# Patient Record
Sex: FEMALE | Race: WHITE | NOT HISPANIC OR LATINO | Employment: FULL TIME | URBAN - METROPOLITAN AREA
[De-identification: names, ages, dates, MRNs, and addresses within clinical notes are randomized per-mention and may not be internally consistent; named-entity substitution may affect disease eponyms.]

---

## 2017-01-11 LAB
EXTERNAL ABO GROUPING: NORMAL
EXTERNAL ANTIBODY SCREEN: NEGATIVE
EXTERNAL HEPATITIS B SURFACE ANTIGEN: NEGATIVE
EXTERNAL RH FACTOR: NEGATIVE
EXTERNAL RUBELLA QUALITATIVE: NORMAL
EXTERNAL SYPHILIS RPR SCREEN: NORMAL
HIV1 AB SPEC QL IA.RAPID: NEGATIVE

## 2017-06-06 LAB — EXTERNAL GTT 1 HOUR: 102

## 2017-07-31 PROCEDURE — 87081 CULTURE SCREEN ONLY: CPT | Performed by: OBSTETRICS & GYNECOLOGY

## 2017-08-01 ENCOUNTER — LAB REQUISITION (OUTPATIENT)
Dept: LAB | Facility: HOSPITAL | Age: 37
End: 2017-08-01

## 2017-08-01 DIAGNOSIS — Z34.83 ENCOUNTER FOR SUPERVISION OF OTHER NORMAL PREGNANCY, THIRD TRIMESTER: ICD-10-CM

## 2017-08-04 LAB
BACTERIA SPEC AEROBE CULT: NORMAL
EXTERNAL GROUP B STREP ANTIGEN: NEGATIVE

## 2017-08-18 ENCOUNTER — PREP FOR SURGERY (OUTPATIENT)
Dept: OTHER | Facility: HOSPITAL | Age: 37
End: 2017-08-18

## 2017-08-18 DIAGNOSIS — Z3A.39 39 WEEKS GESTATION OF PREGNANCY: Primary | ICD-10-CM

## 2017-08-18 RX ORDER — SODIUM CHLORIDE 0.9 % (FLUSH) 0.9 %
1-10 SYRINGE (ML) INJECTION AS NEEDED
Status: CANCELLED | OUTPATIENT
Start: 2017-08-18

## 2017-08-18 RX ORDER — SODIUM CHLORIDE, SODIUM LACTATE, POTASSIUM CHLORIDE, CALCIUM CHLORIDE 600; 310; 30; 20 MG/100ML; MG/100ML; MG/100ML; MG/100ML
100 INJECTION, SOLUTION INTRAVENOUS CONTINUOUS
Status: CANCELLED | OUTPATIENT
Start: 2017-08-18

## 2017-08-18 RX ORDER — CEFAZOLIN SODIUM 2 G/100ML
2 INJECTION, SOLUTION INTRAVENOUS ONCE
Status: CANCELLED | OUTPATIENT
Start: 2017-08-18 | End: 2017-08-18

## 2017-08-20 ENCOUNTER — APPOINTMENT (OUTPATIENT)
Dept: PREADMISSION TESTING | Facility: HOSPITAL | Age: 37
End: 2017-08-20

## 2017-08-20 VITALS — HEIGHT: 67 IN | WEIGHT: 212.3 LBS | BODY MASS INDEX: 33.32 KG/M2

## 2017-08-20 DIAGNOSIS — Z3A.39 39 WEEKS GESTATION OF PREGNANCY: ICD-10-CM

## 2017-08-20 LAB
ABO GROUP BLD: NORMAL
ANTI-D, PASSIVE: NORMAL
BASOPHILS # BLD AUTO: 0.01 10*3/MM3 (ref 0–0.2)
BASOPHILS NFR BLD AUTO: 0.1 % (ref 0–1)
BLD GP AB SCN SERPL QL: POSITIVE
DEPRECATED RDW RBC AUTO: 45.1 FL (ref 37–54)
EOSINOPHIL # BLD AUTO: 0.11 10*3/MM3 (ref 0–0.3)
EOSINOPHIL NFR BLD AUTO: 1.3 % (ref 0–3)
ERYTHROCYTE [DISTWIDTH] IN BLOOD BY AUTOMATED COUNT: 14.6 % (ref 11.3–14.5)
HCT VFR BLD AUTO: 33.6 % (ref 34.5–44)
HGB BLD-MCNC: 10.6 G/DL (ref 11.5–15.5)
IMM GRANULOCYTES # BLD: 0.03 10*3/MM3 (ref 0–0.03)
IMM GRANULOCYTES NFR BLD: 0.4 % (ref 0–0.6)
LYMPHOCYTES # BLD AUTO: 1.61 10*3/MM3 (ref 0.6–4.8)
LYMPHOCYTES NFR BLD AUTO: 19.1 % (ref 24–44)
MCH RBC QN AUTO: 27.3 PG (ref 27–31)
MCHC RBC AUTO-ENTMCNC: 31.5 G/DL (ref 32–36)
MCV RBC AUTO: 86.6 FL (ref 80–99)
MONOCYTES # BLD AUTO: 0.54 10*3/MM3 (ref 0–1)
MONOCYTES NFR BLD AUTO: 6.4 % (ref 0–12)
NEUTROPHILS # BLD AUTO: 6.13 10*3/MM3 (ref 1.5–8.3)
NEUTROPHILS NFR BLD AUTO: 72.7 % (ref 41–71)
PLATELET # BLD AUTO: 164 10*3/MM3 (ref 150–450)
PMV BLD AUTO: 11.3 FL (ref 6–12)
RBC # BLD AUTO: 3.88 10*6/MM3 (ref 3.89–5.14)
RH BLD: NEGATIVE
WBC NRBC COR # BLD: 8.43 10*3/MM3 (ref 3.5–10.8)

## 2017-08-20 NOTE — PAT
MEASURED FOR TEDS/SCDS IN PAT    CALF MEASUREMENT    17  LENGTH MEASUREMENT 18.5    Patient to apply to surgical area (as instructed) the night before procedure and the AM of procedure.

## 2017-08-20 NOTE — DISCHARGE INSTRUCTIONS
Dear Patient,    We are happy that you have chosen [facility] to have your baby.  We hope that by providing you with the following information, your childbirth experience will be a positive one.    What to know before your arrive:     -Do not eat, drink or chew gum after midnight the day before your procedure.    This also includes mints.   -Do not shave any part of your body including abdomen or pelvic are for two    days before your procedure.   -If you are taking a scheduled medication (insulin, blood pressure medicine,   antibiotics) please consult with your physician whether to take on the day of   surgery.   -Remove all jewelry including rings, wedding bands, and piercing before coming   to the hospital.   -Leave important valuables at home.   -Do not wear dark fingernail polish.   -Bring the following with you to the hospital:    -Picture ID and insurance, Medicare or Medicaid cards    -Co-pay/deductible required by insurance (Cash, Check, Credit Card)    -Copy of living will or power  document (if applicable)    -CPAP mask and tubing, not machine (if applicable)    -Skin prep instructions sheet    What to know the day of procedure:     -[facility], take elevator for first floor, exit to the right and  proceed through the doors to outside, follow the covered sidewalk to the  entrance of the [facility], follow the hallway and signs to the [facility],  enter the [place] to your right BEFORE entering the 1720 lobby.  Take the  elevators to the 3rd floor ([address] [place]).   -Leave unnecessary items in your vehicle, including your suitcase.  Your support  person or a family member can get it for you after your procedure.   -Check in at the reception desk in the lobby of the 3rd floor ([address] [place]).   -One person may accompany you to the pre-op/recovery area.  Please have  other family members wait in the waiting room.   -An anesthesiologist will meet with your prior to  your procedure.   -After anesthesia has been initiated, one person may accompany you in the  operating room.   -No video cameras are permitted in the operating room; only still cameras,  Please.      What to expect while you are in recovery:     -One person may stay with you while you are in recovery.   -If the baby is stable, he/she may visit to initiate breastfeeding & [facility].

## 2017-08-21 ENCOUNTER — ANESTHESIA (OUTPATIENT)
Dept: LABOR AND DELIVERY | Facility: HOSPITAL | Age: 37
End: 2017-08-21

## 2017-08-21 ENCOUNTER — ANESTHESIA EVENT (OUTPATIENT)
Dept: LABOR AND DELIVERY | Facility: HOSPITAL | Age: 37
End: 2017-08-21

## 2017-08-21 ENCOUNTER — HOSPITAL ENCOUNTER (INPATIENT)
Facility: HOSPITAL | Age: 37
LOS: 3 days | Discharge: HOME | End: 2017-08-24
Attending: OBSTETRICS & GYNECOLOGY | Admitting: OBSTETRICS & GYNECOLOGY

## 2017-08-21 VITALS — SYSTOLIC BLOOD PRESSURE: 108 MMHG | HEART RATE: 92 BPM | DIASTOLIC BLOOD PRESSURE: 57 MMHG | OXYGEN SATURATION: 100 %

## 2017-08-21 DIAGNOSIS — Z3A.39 39 WEEKS GESTATION OF PREGNANCY: ICD-10-CM

## 2017-08-21 PROBLEM — Z34.90 CURRENTLY PREGNANT: Status: ACTIVE | Noted: 2017-08-21

## 2017-08-21 LAB
AMPHET+METHAMPHET UR QL: NEGATIVE
AMPHETAMINES UR QL: NEGATIVE
ATMOSPHERIC PRESS: ABNORMAL MMHG
ATMOSPHERIC PRESS: ABNORMAL MMHG
BARBITURATES UR QL SCN: NEGATIVE
BASE EXCESS BLDCOA CALC-SCNC: -5.4 MMOL/L (ref 0–2)
BASE EXCESS BLDCOV CALC-SCNC: -3.8 MMOL/L (ref 0–2)
BDY SITE: ABNORMAL
BENZODIAZ UR QL SCN: NEGATIVE
BUPRENORPHINE SERPL-MCNC: NEGATIVE NG/ML
CANNABINOIDS SERPL QL: NEGATIVE
CO2 BLDA-SCNC: 24.3 MMOL/L (ref 22–33)
CO2 BLDA-SCNC: 25.2 MMOL/L (ref 22–33)
COCAINE UR QL: NEGATIVE
EXTERNAL URINE DRUG SCREEN: NORMAL
HCO3 BLDCOA-SCNC: 23.4 MMOL/L (ref 16.9–20.5)
HCO3 BLDCOV-SCNC: 22.9 MMOL/L (ref 18.6–21.4)
HGB BLDA-MCNC: 16.5 G/DL (ref 14–18)
HGB BLDA-MCNC: 17.1 G/DL (ref 14–18)
HOROWITZ INDEX BLD+IHG-RTO: 21 %
HOROWITZ INDEX BLD+IHG-RTO: 21 %
METHADONE UR QL SCN: NEGATIVE
MODALITY: ABNORMAL
MODALITY: ABNORMAL
OPIATES UR QL: NEGATIVE
OXYCODONE UR QL SCN: NEGATIVE
PCO2 BLDCOA: 57.2 MMHG (ref 43.3–54.9)
PCO2 BLDCOV: 46.2 MM HG (ref 30–60)
PCP UR QL SCN: NEGATIVE
PH BLDCOA: 7.22 [PH] (ref 7.2–7.3)
PH BLDCOV: 7.3 [PH] (ref 7.19–7.46)
PO2 BLDCOA: 12.2 MMHG (ref 11.5–43.3)
PO2 BLDCOV: 21.2 MM HG
PROPOXYPH UR QL: NEGATIVE
SAO2 % BLDCOA: 14.8 %
SAO2 % BLDCOA: ABNORMAL % (ref 45–75)
SAO2 % BLDCOV: 38.4 %
TRICYCLICS UR QL SCN: NEGATIVE

## 2017-08-21 PROCEDURE — 25010000002 RHO D IMMUNE GLOBULIN 1500 UNIT/2ML SOLUTION PREFILLED SYRINGE: Performed by: OBSTETRICS & GYNECOLOGY

## 2017-08-21 PROCEDURE — 25010000002 FENTANYL CITRATE (PF) 100 MCG/2ML SOLUTION: Performed by: NURSE ANESTHETIST, CERTIFIED REGISTERED

## 2017-08-21 PROCEDURE — 25010000003 MORPHINE PER 10 MG: Performed by: NURSE ANESTHETIST, CERTIFIED REGISTERED

## 2017-08-21 PROCEDURE — 80306 DRUG TEST PRSMV INSTRMNT: CPT | Performed by: OBSTETRICS & GYNECOLOGY

## 2017-08-21 PROCEDURE — 25010000002 ONDANSETRON PER 1 MG: Performed by: NURSE ANESTHETIST, CERTIFIED REGISTERED

## 2017-08-21 PROCEDURE — 82805 BLOOD GASES W/O2 SATURATION: CPT | Performed by: OBSTETRICS & GYNECOLOGY

## 2017-08-21 PROCEDURE — 25010000002 MIDAZOLAM PER 1 MG: Performed by: NURSE ANESTHETIST, CERTIFIED REGISTERED

## 2017-08-21 PROCEDURE — 25010000002 METOCLOPRAMIDE PER 10 MG: Performed by: NURSE ANESTHETIST, CERTIFIED REGISTERED

## 2017-08-21 PROCEDURE — 59025 FETAL NON-STRESS TEST: CPT

## 2017-08-21 PROCEDURE — 25010000003 CEFAZOLIN IN DEXTROSE 2-4 GM/100ML-% SOLUTION: Performed by: NURSE PRACTITIONER

## 2017-08-21 RX ORDER — BUPIVACAINE HYDROCHLORIDE 7.5 MG/ML
INJECTION, SOLUTION EPIDURAL; RETROBULBAR AS NEEDED
Status: DISCONTINUED | OUTPATIENT
Start: 2017-08-21 | End: 2017-08-21 | Stop reason: SURG

## 2017-08-21 RX ORDER — MORPHINE SULFATE 0.5 MG/ML
INJECTION, SOLUTION EPIDURAL; INTRATHECAL; INTRAVENOUS AS NEEDED
Status: DISCONTINUED | OUTPATIENT
Start: 2017-08-21 | End: 2017-08-21 | Stop reason: SURG

## 2017-08-21 RX ORDER — LANOLIN 100 %
OINTMENT (GRAM) TOPICAL
Status: DISCONTINUED | OUTPATIENT
Start: 2017-08-21 | End: 2017-08-24 | Stop reason: HOSPADM

## 2017-08-21 RX ORDER — MORPHINE SULFATE 4 MG/ML
2 INJECTION, SOLUTION INTRAMUSCULAR; INTRAVENOUS
Status: DISCONTINUED | OUTPATIENT
Start: 2017-08-21 | End: 2017-08-21 | Stop reason: HOSPADM

## 2017-08-21 RX ORDER — PRENATAL VIT/IRON FUM/FOLIC AC 27MG-0.8MG
1 TABLET ORAL DAILY
Status: DISCONTINUED | OUTPATIENT
Start: 2017-08-21 | End: 2017-08-24 | Stop reason: HOSPADM

## 2017-08-21 RX ORDER — NALOXONE HCL 0.4 MG/ML
0.4 VIAL (ML) INJECTION
Status: ACTIVE | OUTPATIENT
Start: 2017-08-21 | End: 2017-08-22

## 2017-08-21 RX ORDER — SIMETHICONE 80 MG
80 TABLET,CHEWABLE ORAL 4 TIMES DAILY PRN
Status: DISCONTINUED | OUTPATIENT
Start: 2017-08-21 | End: 2017-08-24 | Stop reason: HOSPADM

## 2017-08-21 RX ORDER — OXYTOCIN/RINGER'S LACTATE 20/1000 ML
PLASTIC BAG, INJECTION (ML) INTRAVENOUS
Status: COMPLETED
Start: 2017-08-21 | End: 2017-08-21

## 2017-08-21 RX ORDER — CEFAZOLIN SODIUM 2 G/100ML
2 INJECTION, SOLUTION INTRAVENOUS ONCE
Status: COMPLETED | OUTPATIENT
Start: 2017-08-21 | End: 2017-08-21

## 2017-08-21 RX ORDER — ATROPINE SULFATE 0.4 MG/ML
AMPUL (ML) INJECTION AS NEEDED
Status: DISCONTINUED | OUTPATIENT
Start: 2017-08-21 | End: 2017-08-21 | Stop reason: SURG

## 2017-08-21 RX ORDER — SODIUM CHLORIDE, SODIUM LACTATE, POTASSIUM CHLORIDE, CALCIUM CHLORIDE 600; 310; 30; 20 MG/100ML; MG/100ML; MG/100ML; MG/100ML
100 INJECTION, SOLUTION INTRAVENOUS CONTINUOUS
Status: DISCONTINUED | OUTPATIENT
Start: 2017-08-21 | End: 2017-08-21

## 2017-08-21 RX ORDER — MIDAZOLAM HYDROCHLORIDE 1 MG/ML
INJECTION INTRAMUSCULAR; INTRAVENOUS AS NEEDED
Status: DISCONTINUED | OUTPATIENT
Start: 2017-08-21 | End: 2017-08-21 | Stop reason: SURG

## 2017-08-21 RX ORDER — FAMOTIDINE 10 MG/ML
INJECTION, SOLUTION INTRAVENOUS AS NEEDED
Status: DISCONTINUED | OUTPATIENT
Start: 2017-08-21 | End: 2017-08-21 | Stop reason: SURG

## 2017-08-21 RX ORDER — DIPHENHYDRAMINE HCL 25 MG
25 CAPSULE ORAL EVERY 4 HOURS PRN
Status: DISCONTINUED | OUTPATIENT
Start: 2017-08-21 | End: 2017-08-24 | Stop reason: HOSPADM

## 2017-08-21 RX ORDER — OXYCODONE HYDROCHLORIDE AND ACETAMINOPHEN 5; 325 MG/1; MG/1
2 TABLET ORAL ONCE AS NEEDED
Status: COMPLETED | OUTPATIENT
Start: 2017-08-21 | End: 2017-08-21

## 2017-08-21 RX ORDER — DIPHENHYDRAMINE HYDROCHLORIDE 50 MG/ML
25 INJECTION INTRAMUSCULAR; INTRAVENOUS EVERY 4 HOURS PRN
Status: DISCONTINUED | OUTPATIENT
Start: 2017-08-21 | End: 2017-08-24 | Stop reason: HOSPADM

## 2017-08-21 RX ORDER — OXYCODONE HYDROCHLORIDE AND ACETAMINOPHEN 5; 325 MG/1; MG/1
1 TABLET ORAL EVERY 4 HOURS PRN
Status: DISCONTINUED | OUTPATIENT
Start: 2017-08-21 | End: 2017-08-24 | Stop reason: HOSPADM

## 2017-08-21 RX ORDER — ONDANSETRON 4 MG/1
4 TABLET, FILM COATED ORAL EVERY 8 HOURS PRN
Status: DISCONTINUED | OUTPATIENT
Start: 2017-08-21 | End: 2017-08-24 | Stop reason: HOSPADM

## 2017-08-21 RX ORDER — ACETAMINOPHEN 325 MG/1
650 TABLET ORAL ONCE AS NEEDED
Status: DISCONTINUED | OUTPATIENT
Start: 2017-08-21 | End: 2017-08-21 | Stop reason: HOSPADM

## 2017-08-21 RX ORDER — IBUPROFEN 600 MG/1
600 TABLET ORAL EVERY 8 HOURS PRN
Status: DISCONTINUED | OUTPATIENT
Start: 2017-08-21 | End: 2017-08-24 | Stop reason: HOSPADM

## 2017-08-21 RX ORDER — ONDANSETRON 2 MG/ML
4 INJECTION INTRAMUSCULAR; INTRAVENOUS ONCE
Status: DISCONTINUED | OUTPATIENT
Start: 2017-08-21 | End: 2017-08-21 | Stop reason: HOSPADM

## 2017-08-21 RX ORDER — PRENATAL WITH FERROUS FUM AND FOLIC ACID 3080; 920; 120; 400; 22; 1.84; 3; 20; 10; 1; 12; 200; 27; 25; 2 [IU]/1; [IU]/1; MG/1; [IU]/1; MG/1; MG/1; MG/1; MG/1; MG/1; MG/1; UG/1; MG/1; MG/1; MG/1; MG/1
1 TABLET ORAL DAILY
COMMUNITY
End: 2022-02-11

## 2017-08-21 RX ORDER — SODIUM CHLORIDE 0.9 % (FLUSH) 0.9 %
1-10 SYRINGE (ML) INJECTION AS NEEDED
Status: DISCONTINUED | OUTPATIENT
Start: 2017-08-21 | End: 2017-08-21

## 2017-08-21 RX ORDER — METOCLOPRAMIDE HYDROCHLORIDE 5 MG/ML
10 INJECTION INTRAMUSCULAR; INTRAVENOUS ONCE AS NEEDED
Status: DISCONTINUED | OUTPATIENT
Start: 2017-08-21 | End: 2017-08-21 | Stop reason: HOSPADM

## 2017-08-21 RX ORDER — ONDANSETRON 2 MG/ML
INJECTION INTRAMUSCULAR; INTRAVENOUS AS NEEDED
Status: DISCONTINUED | OUTPATIENT
Start: 2017-08-21 | End: 2017-08-21 | Stop reason: SURG

## 2017-08-21 RX ORDER — OXYTOCIN 10 [USP'U]/ML
INJECTION, SOLUTION INTRAMUSCULAR; INTRAVENOUS AS NEEDED
Status: DISCONTINUED | OUTPATIENT
Start: 2017-08-21 | End: 2017-08-21 | Stop reason: SURG

## 2017-08-21 RX ORDER — METOCLOPRAMIDE HYDROCHLORIDE 5 MG/ML
INJECTION INTRAMUSCULAR; INTRAVENOUS AS NEEDED
Status: DISCONTINUED | OUTPATIENT
Start: 2017-08-21 | End: 2017-08-21 | Stop reason: SURG

## 2017-08-21 RX ORDER — TRISODIUM CITRATE DIHYDRATE AND CITRIC ACID MONOHYDRATE 500; 334 MG/5ML; MG/5ML
30 SOLUTION ORAL ONCE
Status: COMPLETED | OUTPATIENT
Start: 2017-08-21 | End: 2017-08-21

## 2017-08-21 RX ORDER — FENTANYL CITRATE 50 UG/ML
INJECTION, SOLUTION INTRAMUSCULAR; INTRAVENOUS AS NEEDED
Status: DISCONTINUED | OUTPATIENT
Start: 2017-08-21 | End: 2017-08-21 | Stop reason: SURG

## 2017-08-21 RX ORDER — DOCUSATE SODIUM 100 MG/1
100 CAPSULE, LIQUID FILLED ORAL 2 TIMES DAILY PRN
Status: DISCONTINUED | OUTPATIENT
Start: 2017-08-21 | End: 2017-08-24 | Stop reason: HOSPADM

## 2017-08-21 RX ORDER — SODIUM CHLORIDE 0.9 % (FLUSH) 0.9 %
1-10 SYRINGE (ML) INJECTION AS NEEDED
Status: DISCONTINUED | OUTPATIENT
Start: 2017-08-21 | End: 2017-08-24 | Stop reason: HOSPADM

## 2017-08-21 RX ORDER — HYDROCODONE BITARTRATE AND ACETAMINOPHEN 5; 325 MG/1; MG/1
1 TABLET ORAL EVERY 4 HOURS PRN
Status: DISCONTINUED | OUTPATIENT
Start: 2017-08-21 | End: 2017-08-24 | Stop reason: HOSPADM

## 2017-08-21 RX ORDER — HYDROMORPHONE HYDROCHLORIDE 1 MG/ML
0.5 INJECTION, SOLUTION INTRAMUSCULAR; INTRAVENOUS; SUBCUTANEOUS
Status: DISCONTINUED | OUTPATIENT
Start: 2017-08-21 | End: 2017-08-21 | Stop reason: HOSPADM

## 2017-08-21 RX ADMIN — MORPHINE SULFATE 150 MCG: 0.5 INJECTION, SOLUTION EPIDURAL; INTRATHECAL; INTRAVENOUS at 07:45

## 2017-08-21 RX ADMIN — SODIUM CHLORIDE, POTASSIUM CHLORIDE, SODIUM LACTATE AND CALCIUM CHLORIDE: 600; 310; 30; 20 INJECTION, SOLUTION INTRAVENOUS at 08:49

## 2017-08-21 RX ADMIN — SODIUM CHLORIDE, POTASSIUM CHLORIDE, SODIUM LACTATE AND CALCIUM CHLORIDE: 600; 310; 30; 20 INJECTION, SOLUTION INTRAVENOUS at 07:48

## 2017-08-21 RX ADMIN — SIMETHICONE CHEW TAB 80 MG 80 MG: 80 TABLET ORAL at 13:52

## 2017-08-21 RX ADMIN — ATROPINE SULFATE 0.2 MG: 0.4 INJECTION, SOLUTION INTRAMUSCULAR; INTRAVENOUS; SUBCUTANEOUS at 07:39

## 2017-08-21 RX ADMIN — IBUPROFEN 600 MG: 600 TABLET, FILM COATED ORAL at 22:03

## 2017-08-21 RX ADMIN — Medication 20 UNITS: at 12:23

## 2017-08-21 RX ADMIN — SIMETHICONE CHEW TAB 80 MG 80 MG: 80 TABLET ORAL at 22:03

## 2017-08-21 RX ADMIN — ATROPINE SULFATE 0.2 MG: 0.4 INJECTION, SOLUTION INTRAMUSCULAR; INTRAVENOUS; SUBCUTANEOUS at 07:49

## 2017-08-21 RX ADMIN — SODIUM CHLORIDE, POTASSIUM CHLORIDE, SODIUM LACTATE AND CALCIUM CHLORIDE: 600; 310; 30; 20 INJECTION, SOLUTION INTRAVENOUS at 08:10

## 2017-08-21 RX ADMIN — EPHEDRINE SULFATE 10 MG: 50 INJECTION INTRAMUSCULAR; INTRAVENOUS; SUBCUTANEOUS at 08:40

## 2017-08-21 RX ADMIN — OXYCODONE AND ACETAMINOPHEN 1 TABLET: 5; 325 TABLET ORAL at 22:03

## 2017-08-21 RX ADMIN — ONDANSETRON 4 MG: 2 INJECTION INTRAMUSCULAR; INTRAVENOUS at 07:39

## 2017-08-21 RX ADMIN — OXYCODONE AND ACETAMINOPHEN 1 TABLET: 5; 325 TABLET ORAL at 10:24

## 2017-08-21 RX ADMIN — OXYTOCIN 30 UNITS: 10 INJECTION, SOLUTION INTRAMUSCULAR; INTRAVENOUS at 08:10

## 2017-08-21 RX ADMIN — OXYCODONE AND ACETAMINOPHEN 1 TABLET: 5; 325 TABLET ORAL at 09:45

## 2017-08-21 RX ADMIN — SODIUM CITRATE AND CITRIC ACID MONOHYDRATE 30 ML: 500; 334 SOLUTION ORAL at 07:33

## 2017-08-21 RX ADMIN — OXYCODONE AND ACETAMINOPHEN 1 TABLET: 5; 325 TABLET ORAL at 13:52

## 2017-08-21 RX ADMIN — SIMETHICONE CHEW TAB 80 MG 80 MG: 80 TABLET ORAL at 18:06

## 2017-08-21 RX ADMIN — CEFAZOLIN SODIUM 2 G: 2 INJECTION, SOLUTION INTRAVENOUS at 07:34

## 2017-08-21 RX ADMIN — EPHEDRINE SULFATE 10 MG: 50 INJECTION INTRAMUSCULAR; INTRAVENOUS; SUBCUTANEOUS at 08:25

## 2017-08-21 RX ADMIN — IBUPROFEN 600 MG: 600 TABLET, FILM COATED ORAL at 13:52

## 2017-08-21 RX ADMIN — BUPIVACAINE HYDROCHLORIDE 1.5 ML: 7.5 INJECTION, SOLUTION EPIDURAL; RETROBULBAR at 07:45

## 2017-08-21 RX ADMIN — SODIUM CHLORIDE, POTASSIUM CHLORIDE, SODIUM LACTATE AND CALCIUM CHLORIDE 2000 ML/HR: 600; 310; 30; 20 INJECTION, SOLUTION INTRAVENOUS at 06:47

## 2017-08-21 RX ADMIN — METOCLOPRAMIDE 10 MG: 5 INJECTION, SOLUTION INTRAMUSCULAR; INTRAVENOUS at 07:26

## 2017-08-21 RX ADMIN — OXYCODONE AND ACETAMINOPHEN 1 TABLET: 5; 325 TABLET ORAL at 18:06

## 2017-08-21 RX ADMIN — SODIUM CHLORIDE, POTASSIUM CHLORIDE, SODIUM LACTATE AND CALCIUM CHLORIDE 1000 ML/HR: 600; 310; 30; 20 INJECTION, SOLUTION INTRAVENOUS at 07:23

## 2017-08-21 RX ADMIN — FENTANYL CITRATE 15 MCG: 50 INJECTION, SOLUTION INTRAMUSCULAR; INTRAVENOUS at 07:45

## 2017-08-21 RX ADMIN — MIDAZOLAM HYDROCHLORIDE 1 MG: 1 INJECTION, SOLUTION INTRAMUSCULAR; INTRAVENOUS at 07:39

## 2017-08-21 RX ADMIN — OXYTOCIN 30 UNITS: 10 INJECTION, SOLUTION INTRAMUSCULAR; INTRAVENOUS at 08:49

## 2017-08-21 RX ADMIN — DOCUSATE SODIUM 100 MG: 100 CAPSULE, LIQUID FILLED ORAL at 18:06

## 2017-08-21 RX ADMIN — HUMAN RHO(D) IMMUNE GLOBULIN 300 MCG: 1500 SOLUTION INTRAMUSCULAR; INTRAVENOUS at 15:07

## 2017-08-21 RX ADMIN — FAMOTIDINE 20 MG: 10 INJECTION, SOLUTION INTRAVENOUS at 07:26

## 2017-08-21 NOTE — ANESTHESIA PREPROCEDURE EVALUATION
Anesthesia Evaluation     Patient summary reviewed and Nursing notes reviewed   history of anesthetic complications (cardiac arrest during a lap yosvany upon insufflation, recussitated within 30 seconds, no sequale, neurocardiogenic syndrome ): PONV  NPO Solid Status: > 8 hours  NPO Liquid Status: > 8 hours     Airway   Mallampati: II  TM distance: >3 FB  Neck ROM: full  Dental      Pulmonary - negative pulmonary ROS   Cardiovascular - negative cardio ROS        Neuro/Psych  (+) syncope (neurocardiogenic syncope),    GI/Hepatic/Renal/Endo    (+)  GERD,     ROS Comment: Ulcerative colitis    Musculoskeletal (-) negative ROS    Abdominal    Substance History - negative use     OB/GYN    (+) Pregnant,         Other - negative ROS                                   Anesthesia Plan    ASA 2     spinal and ITN     Anesthetic plan and risks discussed with patient.

## 2017-08-21 NOTE — PLAN OF CARE
Problem: Patient Care Overview (Adult)  Goal: Plan of Care Review  Outcome: Ongoing (interventions implemented as appropriate)    08/21/17 0800   Coping/Psychosocial Response Interventions   Plan Of Care Reviewed With patient;spouse   Patient Care Overview   Progress no change       Goal: Adult Individualization and Mutuality  Outcome: Ongoing (interventions implemented as appropriate)  Goal: Discharge Needs Assessment  Outcome: Ongoing (interventions implemented as appropriate)    08/21/17 0800   Discharge Needs Assessment   Concerns To Be Addressed no discharge needs identified   Readmission Within The Last 30 Days no previous admission in last 30 days   Equipment Needed After Discharge none   Current Health   Anticipated Changes Related to Illness none   Living Environment   Transportation Available car

## 2017-08-21 NOTE — PLAN OF CARE
Problem: Patient Care Overview (Adult)  Goal: Plan of Care Review  Outcome: Ongoing (interventions implemented as appropriate)    Problem: Breastfeeding (Adult,NICU,[place],Obstetrics,Pediatric)  Goal: Signs and Symptoms of Listed Potential Problems Will be Absent or Manageable (Breastfeeding)  Outcome: Ongoing (interventions implemented as appropriate)

## 2017-08-21 NOTE — ANESTHESIA PROCEDURE NOTES
Spinal Block    Patient location during procedure: OR  Indication:at surgeon's request  Performed By  Anesthesiologist: YOSSI NEGRETE  CRNA: JUNIOR THAKUR  Preanesthetic Checklist  Completed: patient identified, surgical consent, pre-op evaluation, timeout performed, IV checked, risks and benefits discussed and monitors and equipment checked  Spinal Block Prep:  Patient Position:sitting  Sterile Tech:cap, gloves, mask and sterile barriers  Prep:Betadine  Patient Monitoring:blood pressure monitoring, continuous pulse oximetry and EKG  Spinal Block Procedure  Approach:midline  Guidance:palpation technique  Location:L3-L4  Needle Type:Lidia  Needle Gauge:25 G  Placement of Spinal needle event:cerebrospinal fluid aspirated  Paresthesia: no  Fluid Appearance:clear  Post Assessment  Patient Tolerance:patient tolerated the procedure well with no apparent complications  Complications no

## 2017-08-21 NOTE — OP NOTE
[facility]   Section Operative Note    Pre-Operative Dx:   1.  IUP at 39w2d  weeks     2. Prior         Postoperative dx:    1.  Same     Procedure: Procedure(s):   SECTION REPEAT  PATIENT HAS HX OF NUEROCARDIOGENIC SYNCOPE    Surgeon: Dina Campbell MD    Assistant: darci De La Cruz   Anesthesia: Spinal    EBL:    mls.  1500 mL mls.         IV Fluids: 2200 mls.   UOP: 300 mls.       Antibiotics: cefazolin (Ancef)     Infant:            Gender: male  infant    Weight: No birth weight on file.     Apgars: 8   @ 1 minute /     9   @ 5 minutes    Fetal presentation: cephalic   Amniotic fluid: Meconium      Complications:   None      Disposition:   Mother to Mother Baby/Postpartum  in stable condition currently.   Baby to NBN  in stable condition currently.       Procedure:   The patient was taken to the operating room where spinal anesthesia was placed, and she was placed in supine position with a leftward tilt. Sequential compression devices on bilateral lower extremities and a Chavez catheter placed in her bladder. After being prepped and draped in a normal sterile fashion, a Pfannenstiel skin incision was made with a scalpel and taken down to the level of the fascia using the Bovie. The fascia was incised in the midline and extended laterally. The superior edge of the fascia was grasped with Kocher clamps, elevated and the rectus muscle dissected off.During this dissection intraperitoneal placement was noted.  In a similar fashion, the inferior edge of the fascia was grasped with Kocher clamps, elevated and the rectus muscles dissected off.  Dense uterine adhesions then were noted.  These had to be taken down with Bovie and sharp dissection in order to reach the lower uterine segment.  This was a tedious past that approximately 15 minutes.  Bladder blade was placed. A bladder flap was created. A low transverse uterine incision was made with a scalpel and extended laterally. Amniotomy with  meconium fluid occurred at the time of hysterotomy. The head was brought to the uterine incision, and using fundal pressure, the head delivered without complication. Nose and mouth were bulb suctioned.  Shoulders and body were to follow.  The cord was noted ×4.  Cord was clamped x2 and cut. Infant was handed to the awaiting pediatric team. Cord gases and cord blood were obtained. The placenta was manually extracted. The uterus was exteriorized and cleared of all clot and debris and the hysterotomy site was closed with a 1-0 chromic in a running locked fashion starting at the left angle and moving towards the right.  The front of the uterus appeared eroded from the adhesional lysis.  1-0 chromic was used to ligate the uterine sinuses that were bleeding.  Bovie was used as well.  2-0 chromic was used to ligate the right venous sinus in the broad ligament due to bleeding.  This was done superiorly and inferiorly to the bleeding site.  The right fallopian tube had been damaged.  This was due to adhesions.  Once the bleeding was contained copious irrigation occurred.  The uterus was returned to the abdominal cavity. Paracolic gutters were cleared of all clot and debris. The hysterotomy site was noted to be hemostatic as well as the bladder flap. .  FloSeal was used for added hemostasis.The fascia was reapproximated using a looped 0 PDS in a running fashion starting at the left angle and moving towards the right.  The subcutaneous fat layer was hemostatic and closed in an interrupted fashion with 2-0 Plain.  The skin was reapproximated with staples.. All sponge, lap, and needle counts were correct x2. The patient was taken to the recovery room in stable condition.       Dina Campbell MD  8/21/2017  9:09 AM

## 2017-08-21 NOTE — LACTATION NOTE
This note was copied from a baby's chart.     17 1400   Maternal Information   Date of Referral 17   Person Making Referral other (see comments)  (courtesy)   Maternal Reason for Referral milk supply inadequate history   Maternal Infant Assessment   Size Issue, Bilateral Breasts no   Shape, Bilateral Breasts angled;wide   Density, Bilateral Breasts soft   Nipples, Bilateral graspable   Nipple Conditions, Bilateral intact   Maternal Infant Feeding   Maternal Emotional State anxious   Previous Breastfeeding History other (see comments)  (inadequate production with other 2 children)   Equipment Type/Education   Breast Pump Type double electric, personal   Breast Pump Flange Type hard   Breast Pump Flange Size 24 mm    Breastfeeding   Breast Pumping Interventions post-feed pumping encouraged

## 2017-08-21 NOTE — ANESTHESIA POSTPROCEDURE EVALUATION
Patient: Demetria Juarez    Procedure Summary     Date Anesthesia Start Anesthesia Stop Room / Location    17 0738 0906  [facility]  /  [facility]       Procedure Diagnosis Surgeon Provider     SECTION REPEAT  PATIENT HAS HX OF NUEROCARDIOGENIC SYNCOPE  (N/A Abdomen) No diagnosis on file. MD Vincent Villarreal MD          Anesthesia Type: No value filed.  Last vitals  BP     99/46   Temp 97.6       Pulse    95   Resp     18   SpO2 98         Post Anesthesia Care and Evaluation    Patient location during evaluation: bedside  Patient participation: complete - patient participated  Level of consciousness: awake and alert  Pain management: adequate  Airway patency: patent  Anesthetic complications: No anesthetic complications    Cardiovascular status: acceptable  Respiratory status: acceptable  Hydration status: acceptable

## 2017-08-21 NOTE — H&P
"History and Physical:    Subjective     Chief Complaint   Patient presents with   • Scheduled        Demetria Juarez is a 36 y.o. year old  with an Estimated Date of Delivery: 17 currently at 39w2d presenting with no complaints.    Prenatal care has been with Dina Campbell MD.  It has been significant for proctitis.        Review of Systems  Pertinent items are noted in HPI.     Past Medical History:   Diagnosis Date   • Anemia    • Neurocardiogenic syncope    • PONV (postoperative nausea and vomiting)    • Rh incompatibility     rhogam shot-date unknown   • Ulcerative colitis      Past Surgical History:   Procedure Laterality Date   •  SECTION      times 2   • CHOLECYSTECTOMY       Family History   Problem Relation Age of Onset   • Diabetes Father    • Lung cancer Maternal Grandfather      Social History   Substance Use Topics   • Smoking status: Never Smoker   • Smokeless tobacco: Never Used   • Alcohol use No     Prescriptions Prior to Admission   Medication Sig Dispense Refill Last Dose   • Prenatal Vit-Fe Fumarate-FA (PRENATAL ) 27-1 MG tablet tablet Take 1 tablet by mouth Daily.   Past Week at Unknown time     Allergies:  Ciprofloxacin  OB History    Para Term  AB SAB TAB Ectopic Multiple Living   3 2 2       2      # Outcome Date GA Lbr Jeff/2nd Weight Sex Delivery Anes PTL Lv   3 Current            2 Term 02/17/10 39w0d  8 lb 8 oz (3856 g) M CS-LTranv Spinal N Y   1 Term 05 39w0d  8 lb 5 oz (3771 g) F CS-LTranv EPI N Y      Complications: Breech presentation            Objective     /85 (BP Location: Left arm, Patient Position: Lying)  Pulse 85  Temp 98.5 °F (36.9 °C) (Oral)   Resp 16  Ht 66.5\" (168.9 cm)  Wt 96.2 kg (212 lb)  Breastfeeding? No  BMI 33.71 kg/m2    Physical Exam    General:  No acute distress           Abdomen: Gravid, nontender       FHT's: reactive    Cervix: deferred   [place]: Contraction are none     Lab Review   External " Prenatal Results         Pregnancy Outside Results - these were transcribed from office records.  See scanned records for details. Date Time   Hgb      Hct      ABO ^ B  17    Rh ^ Negative  17    Antibody Screen ^ Negative  17    Glucose Fasting GTT      Glucose Tolerance Test 1 hour ^ 102  17    Glucose Tolerance Test 3 hour      Gonorrhea (discrete)      Chlamydia (discrete)      RPR ^ Non-Reactive  17    VDRL      Syphillis Antibody      Rubella ^ Immune  17    HBsAg ^ Negative  17    Herpes Simplex Virus PCR      Herpes Simplex VIrus Culture      HIV ^ Negative  17    Hep C RNA Quant PCR      Hep C Antibody      Urine Drug Screen ^ pending  17    AFP      Group B Strep ^ Negative  17    GBS Susceptibility to Clindamycin      GBS Susceptibility to Eythromycin      Fetal Fibronectin      Genetic Testing, Maternal Blood             Legend: ^: Historical              Assessment/Plan     ASSESSMENT  1. IUP at 39w2d  2. scheduled  section   3. GBBSnegative  PLAN  1. Admit to labor and delivery            Dina Campbell MD  2017@

## 2017-08-22 LAB
BASOPHILS # BLD AUTO: 0.01 10*3/MM3 (ref 0–0.2)
BASOPHILS NFR BLD AUTO: 0.1 % (ref 0–1)
DEPRECATED RDW RBC AUTO: 45.9 FL (ref 37–54)
EOSINOPHIL # BLD AUTO: 0.14 10*3/MM3 (ref 0–0.3)
EOSINOPHIL NFR BLD AUTO: 1.2 % (ref 0–3)
ERYTHROCYTE [DISTWIDTH] IN BLOOD BY AUTOMATED COUNT: 14.8 % (ref 11.3–14.5)
HCT VFR BLD AUTO: 24.7 % (ref 34.5–44)
HGB BLD-MCNC: 8 G/DL (ref 11.5–15.5)
IMM GRANULOCYTES # BLD: 0.04 10*3/MM3 (ref 0–0.03)
IMM GRANULOCYTES NFR BLD: 0.4 % (ref 0–0.6)
LYMPHOCYTES # BLD AUTO: 1.05 10*3/MM3 (ref 0.6–4.8)
LYMPHOCYTES NFR BLD AUTO: 9.2 % (ref 24–44)
MCH RBC QN AUTO: 28.1 PG (ref 27–31)
MCHC RBC AUTO-ENTMCNC: 32.4 G/DL (ref 32–36)
MCV RBC AUTO: 86.7 FL (ref 80–99)
MONOCYTES # BLD AUTO: 0.6 10*3/MM3 (ref 0–1)
MONOCYTES NFR BLD AUTO: 5.3 % (ref 0–12)
NEUTROPHILS # BLD AUTO: 9.54 10*3/MM3 (ref 1.5–8.3)
NEUTROPHILS NFR BLD AUTO: 83.8 % (ref 41–71)
PLATELET # BLD AUTO: 139 10*3/MM3 (ref 150–450)
PMV BLD AUTO: 10.8 FL (ref 6–12)
RBC # BLD AUTO: 2.85 10*6/MM3 (ref 3.89–5.14)
WBC NRBC COR # BLD: 11.38 10*3/MM3 (ref 3.5–10.8)

## 2017-08-22 PROCEDURE — 85025 COMPLETE CBC W/AUTO DIFF WBC: CPT | Performed by: OBSTETRICS & GYNECOLOGY

## 2017-08-22 PROCEDURE — 85461 HEMOGLOBIN FETAL: CPT | Performed by: OBSTETRICS & GYNECOLOGY

## 2017-08-22 PROCEDURE — 86901 BLOOD TYPING SEROLOGIC RH(D): CPT | Performed by: OBSTETRICS & GYNECOLOGY

## 2017-08-22 PROCEDURE — 63710000001 DIPHENHYDRAMINE PER 50 MG: Performed by: NURSE ANESTHETIST, CERTIFIED REGISTERED

## 2017-08-22 PROCEDURE — 86900 BLOOD TYPING SEROLOGIC ABO: CPT | Performed by: OBSTETRICS & GYNECOLOGY

## 2017-08-22 RX ORDER — DIAPER,BRIEF,INFANT-TODD,DISP
EACH MISCELLANEOUS EVERY 6 HOURS SCHEDULED
Status: DISCONTINUED | OUTPATIENT
Start: 2017-08-22 | End: 2017-08-24 | Stop reason: HOSPADM

## 2017-08-22 RX ADMIN — IBUPROFEN 600 MG: 600 TABLET, FILM COATED ORAL at 13:50

## 2017-08-22 RX ADMIN — SIMETHICONE CHEW TAB 80 MG 80 MG: 80 TABLET ORAL at 10:32

## 2017-08-22 RX ADMIN — OXYCODONE AND ACETAMINOPHEN 1 TABLET: 5; 325 TABLET ORAL at 06:06

## 2017-08-22 RX ADMIN — SIMETHICONE CHEW TAB 80 MG 80 MG: 80 TABLET ORAL at 22:14

## 2017-08-22 RX ADMIN — SIMETHICONE CHEW TAB 80 MG 80 MG: 80 TABLET ORAL at 18:03

## 2017-08-22 RX ADMIN — OXYCODONE AND ACETAMINOPHEN 1 TABLET: 5; 325 TABLET ORAL at 02:09

## 2017-08-22 RX ADMIN — IBUPROFEN 600 MG: 600 TABLET, FILM COATED ORAL at 19:44

## 2017-08-22 RX ADMIN — DOCUSATE SODIUM 100 MG: 100 CAPSULE, LIQUID FILLED ORAL at 10:02

## 2017-08-22 RX ADMIN — SIMETHICONE CHEW TAB 80 MG 80 MG: 80 TABLET ORAL at 13:50

## 2017-08-22 RX ADMIN — HYDROCODONE BITARTRATE AND ACETAMINOPHEN 1 TABLET: 5; 325 TABLET ORAL at 18:03

## 2017-08-22 RX ADMIN — HYDROCODONE BITARTRATE AND ACETAMINOPHEN 1 TABLET: 5; 325 TABLET ORAL at 22:14

## 2017-08-22 RX ADMIN — PRENATAL VIT W/ FE FUMARATE-FA TAB 27-0.8 MG 1 TABLET: 27-0.8 TAB at 10:21

## 2017-08-22 RX ADMIN — DOCUSATE SODIUM 100 MG: 100 CAPSULE, LIQUID FILLED ORAL at 18:03

## 2017-08-22 RX ADMIN — DIPHENHYDRAMINE HYDROCHLORIDE 25 MG: 25 CAPSULE ORAL at 19:44

## 2017-08-22 RX ADMIN — HYDROCODONE BITARTRATE AND ACETAMINOPHEN 1 TABLET: 5; 325 TABLET ORAL at 13:50

## 2017-08-22 RX ADMIN — OXYCODONE AND ACETAMINOPHEN 1 TABLET: 5; 325 TABLET ORAL at 10:21

## 2017-08-22 RX ADMIN — DIPHENHYDRAMINE HYDROCHLORIDE 25 MG: 25 CAPSULE ORAL at 15:14

## 2017-08-22 RX ADMIN — IBUPROFEN 600 MG: 600 TABLET, FILM COATED ORAL at 06:06

## 2017-08-22 NOTE — ANESTHESIA POSTPROCEDURE EVALUATION
Patient: Demetria Juarez    Procedure Summary     Date Anesthesia Start Anesthesia Stop Room / Location    17 0738 0906  [facility]   [facility]       Procedure Diagnosis Surgeon Provider     SECTION REPEAT  PATIENT HAS HX OF NUEROCARDIOGENIC SYNCOPE  (N/A Abdomen) No diagnosis on file. MD Vincent Villarreal MD          Anesthesia Type: spinal, ITN  Last vitals  BP        Temp        Pulse       Resp        SpO2          Post Anesthesia Care and Evaluation    Patient location during evaluation: bedside  Patient participation: complete - patient participated  Level of consciousness: awake and alert  Pain management: adequate  Airway patency: patent  Anesthetic complications: No anesthetic complications    Cardiovascular status: acceptable  Respiratory status: acceptable  Hydration status: acceptable  Post Neuraxial Block status: Motor and sensory function returned to baseline and No signs or symptoms of PDPH

## 2017-08-22 NOTE — PLAN OF CARE
Problem: Patient Care Overview (Adult)  Goal: Plan of Care Review  Outcome: Ongoing (interventions implemented as appropriate)    17   Coping/Psychosocial Response Interventions   Plan Of Care Reviewed With patient   Patient Care Overview   Progress improving       Goal: Adult Individualization and Mutuality  Outcome: Ongoing (interventions implemented as appropriate)  Goal: Discharge Needs Assessment  Outcome: Ongoing (interventions implemented as appropriate)    Problem: Postpartum ( Delivery) (Adult)  Goal: Signs and Symptoms of Listed Potential Problems Will be Absent or Manageable (Postpartum)  Outcome: Ongoing (interventions implemented as appropriate)    17   Postpartum ( Delivery)   Problems Assessed (Postpartum ) all   Problems Present (Postpartum ) none         Problem: Breastfeeding (Adult,NICU,[place],Obstetrics,Pediatric)  Goal: Signs and Symptoms of Listed Potential Problems Will be Absent or Manageable (Breastfeeding)  Outcome: Ongoing (interventions implemented as appropriate)    17   Breastfeeding   Problems Assessed (Breastfeeding) all   Problems Present (Breastfeeding) none

## 2017-08-22 NOTE — PROGRESS NOTES
2017    Name:Demetria Juarez    MR#:[id number]     PROGRESS NOTE:  Post-Op 1 S/P    HD:1    Subjective   36 y.o. yo Female  s/p CS at 39w2d doing well. Pain well controlled. Tolerating regular diet. No flatus yet.  Lochia normal. C/o dizziness when getting up.      Patient Active Problem List   Diagnosis   • Currently pregnant        Objective    Vitals  Temp:  Temp:  [97.5 °F (36.4 °C)-98.5 °F (36.9 °C)] 98.2 °F (36.8 °C)  Temp src: Oral  BP:  BP: ()/(46-62) 98/53  Pulse:  Heart Rate:  [76-97] 94  RR:   Resp:  [16-18] 18    General Awake, alert, no distress  Abdomen Soft, non-distended, fundus firm, below umbilicus, appropriately tender  Incision  Intact, no erythema or exudate  Extremities Calves NT bilaterally     I/O last 3 completed shifts:  In: 2300 [I.V.:2300]  Out: 4100 [Urine:2600; Blood:1500]    LABS:   Lab Results   Component Value Date    WBC 11.38 (H) 2017    HGB 8.0 (L) 2017    HCT 24.7 (L) 2017    MCV 86.7 2017     (L) 2017       Infant: male       Assessment   1.  POD 1    Plan: Doing well.  Routine postoperative care      Active Problems:   None      Dina Campbell MD  2017 8:18 AM

## 2017-08-23 LAB
ABO GROUP BLD: NORMAL
FETAL BLEED: NEGATIVE
NUMBER OF DOSES: NORMAL
RH BLD: NEGATIVE

## 2017-08-23 RX ADMIN — IBUPROFEN 600 MG: 600 TABLET, FILM COATED ORAL at 13:57

## 2017-08-23 RX ADMIN — OXYCODONE AND ACETAMINOPHEN 1 TABLET: 5; 325 TABLET ORAL at 14:43

## 2017-08-23 RX ADMIN — HYDROCODONE BITARTRATE AND ACETAMINOPHEN 1 TABLET: 5; 325 TABLET ORAL at 10:48

## 2017-08-23 RX ADMIN — HYDROCORTISONE: 1 OINTMENT TOPICAL at 06:35

## 2017-08-23 RX ADMIN — OXYCODONE AND ACETAMINOPHEN 1 TABLET: 5; 325 TABLET ORAL at 23:23

## 2017-08-23 RX ADMIN — HYDROCODONE BITARTRATE AND ACETAMINOPHEN 1 TABLET: 5; 325 TABLET ORAL at 02:19

## 2017-08-23 RX ADMIN — IBUPROFEN 600 MG: 600 TABLET, FILM COATED ORAL at 19:41

## 2017-08-23 RX ADMIN — IBUPROFEN 600 MG: 600 TABLET, FILM COATED ORAL at 02:19

## 2017-08-23 RX ADMIN — HYDROCORTISONE: 1 OINTMENT TOPICAL at 23:48

## 2017-08-23 RX ADMIN — OXYCODONE AND ACETAMINOPHEN 1 TABLET: 5; 325 TABLET ORAL at 19:41

## 2017-08-23 RX ADMIN — HYDROCODONE BITARTRATE AND ACETAMINOPHEN 1 TABLET: 5; 325 TABLET ORAL at 06:08

## 2017-08-23 RX ADMIN — PRENATAL VIT W/ FE FUMARATE-FA TAB 27-0.8 MG 1 TABLET: 27-0.8 TAB at 10:48

## 2017-08-23 NOTE — PROGRESS NOTES
2017    Name:Demetria Juarez    MR#:[id number]     PROGRESS NOTE:  Post-Op 2 S/P    HD:2    Subjective   36 y.o. yo Female  s/p CS at 39w2d doing well. Pain well controlled. Tolerating regular diet and having flatus. Lochia normal. Patient reports pain on skin site from tape irritation    Patient Active Problem List   Diagnosis   • Currently pregnant        Objective    Vitals  Temp:  Temp:  [97.6 °F (36.4 °C)-99 °F (37.2 °C)] 98.2 °F (36.8 °C)  Temp src: Oral  BP:  BP: (108-115)/(52-58) 108/52  Pulse:  Heart Rate:  [] 93  RR:   Resp:  [16-20] 16    General Awake, alert, no distress  Abdomen Soft, non-distended, fundus firm, below umbilicus, appropriately tender- skin breakdown on right flank from tape.   Incision  Intact, no erythema or exudate  Extremities Calves NT bilaterally     I/O last 3 completed shifts:  In: -   Out: 4350 [Urine:4350]    LABS:   Lab Results   Component Value Date    WBC 11.38 (H) 2017    HGB 8.0 (L) 2017    HCT 24.7 (L) 2017    MCV 86.7 2017     (L) 2017       Infant: male       Assessment   1.  POD 2    Plan: Doing well.  Routine postoperative care. Acute anemia from c/s noted. Patient asymptomatic      Active Problems:   None      Dina Campbell MD  2017 8:00 AM

## 2017-08-23 NOTE — PLAN OF CARE
Problem: Patient Care Overview (Adult)  Goal: Plan of Care Review  Outcome: Ongoing (interventions implemented as appropriate)    17   Coping/Psychosocial Response Interventions   Plan Of Care Reviewed With patient   Patient Care Overview   Progress improving       Goal: Adult Individualization and Mutuality  Outcome: Ongoing (interventions implemented as appropriate)  Goal: Discharge Needs Assessment  Outcome: Ongoing (interventions implemented as appropriate)    Problem: Postpartum ( Delivery) (Adult)  Goal: Signs and Symptoms of Listed Potential Problems Will be Absent or Manageable (Postpartum)  Outcome: Ongoing (interventions implemented as appropriate)    17   Postpartum ( Delivery)   Problems Assessed (Postpartum ) all   Problems Present (Postpartum ) none         Problem: Breastfeeding (Adult,NICU,[place],Obstetrics,Pediatric)  Goal: Signs and Symptoms of Listed Potential Problems Will be Absent or Manageable (Breastfeeding)  Outcome: Ongoing (interventions implemented as appropriate)    17   Breastfeeding   Problems Assessed (Breastfeeding) all   Problems Present (Breastfeeding) none

## 2017-08-23 NOTE — PLAN OF CARE
Problem: Patient Care Overview (Adult)  Goal: Plan of Care Review  Outcome: Ongoing (interventions implemented as appropriate)  Goal: Adult Individualization and Mutuality  Outcome: Ongoing (interventions implemented as appropriate)  Goal: Discharge Needs Assessment  Outcome: Ongoing (interventions implemented as appropriate)    Problem: Postpartum ( Delivery) (Adult)  Goal: Signs and Symptoms of Listed Potential Problems Will be Absent or Manageable (Postpartum)  Outcome: Ongoing (interventions implemented as appropriate)    Problem: Breastfeeding (Adult,NICU,,Obstetrics,Pediatric)  Goal: Signs and Symptoms of Listed Potential Problems Will be Absent or Manageable (Breastfeeding)  Outcome: Ongoing (interventions implemented as appropriate)    17 1209   Breastfeeding   Problems Assessed (Breastfeeding) all   Problems Present (Breastfeeding) none

## 2017-08-24 VITALS
RESPIRATION RATE: 16 BRPM | BODY MASS INDEX: 33.27 KG/M2 | DIASTOLIC BLOOD PRESSURE: 51 MMHG | HEIGHT: 67 IN | OXYGEN SATURATION: 99 % | TEMPERATURE: 98.1 F | HEART RATE: 89 BPM | SYSTOLIC BLOOD PRESSURE: 100 MMHG | WEIGHT: 212 LBS

## 2017-08-24 PROBLEM — Z34.90 CURRENTLY PREGNANT: Status: RESOLVED | Noted: 2017-08-21 | Resolved: 2017-08-24

## 2017-08-24 RX ORDER — IBUPROFEN 600 MG/1
600 TABLET ORAL EVERY 8 HOURS PRN
Qty: 30 TABLET | Refills: 0 | Status: SHIPPED | OUTPATIENT
Start: 2017-08-24 | End: 2022-02-11

## 2017-08-24 RX ORDER — OXYCODONE HYDROCHLORIDE AND ACETAMINOPHEN 5; 325 MG/1; MG/1
1-2 TABLET ORAL EVERY 4 HOURS PRN
Qty: 20 TABLET | Refills: 0 | Status: SHIPPED | OUTPATIENT
Start: 2017-08-24 | End: 2017-08-27

## 2017-08-24 RX ADMIN — OXYCODONE AND ACETAMINOPHEN 1 TABLET: 5; 325 TABLET ORAL at 07:23

## 2017-08-24 RX ADMIN — PRENATAL VIT W/ FE FUMARATE-FA TAB 27-0.8 MG 1 TABLET: 27-0.8 TAB at 07:23

## 2017-08-24 RX ADMIN — HYDROCORTISONE: 1 OINTMENT TOPICAL at 05:15

## 2017-08-24 RX ADMIN — OXYCODONE AND ACETAMINOPHEN 1 TABLET: 5; 325 TABLET ORAL at 03:09

## 2017-08-24 RX ADMIN — IBUPROFEN 600 MG: 600 TABLET, FILM COATED ORAL at 07:23

## 2017-08-24 RX ADMIN — OXYCODONE AND ACETAMINOPHEN 1 TABLET: 5; 325 TABLET ORAL at 11:38

## 2017-08-24 RX ADMIN — DOCUSATE SODIUM 100 MG: 100 CAPSULE, LIQUID FILLED ORAL at 07:23

## 2017-08-24 RX ADMIN — SIMETHICONE CHEW TAB 80 MG 80 MG: 80 TABLET ORAL at 07:23

## 2017-08-24 NOTE — DISCHARGE SUMMARY
Discharge Summary    Date of Admission: 2017  Date of Discharge:  2017      Patient: Demetria Juarez      MR#:[id number]    Primary Surgeon/OB: Dina Campbell MD      Presenting Problem/History of Present Illness  Currently pregnant [Z33.1]     Patient Active Problem List   Diagnosis   (none) - all problems resolved or deleted         Discharge Diagnosis:  section at 39w2d    Procedures:  , Low Transverse     2017    8:09 AM        Discharge Date: 2017; Discharge Time: 9:21 AM      Hospital Course  Patient is a 36 y.o. female  at 39w2d status post  section with uneventful postoperative recovery.  Patient was advanced to regular diet on postoperative day#1.  On discharge, ambulating, tolerating a regular diet without any difficulties and her incision is dry, clean and intact.     Infant:   male  fetus 8 lb 7.5 oz (3.84 kg)  with Apgar scores of 8  , 9   at five minutes.    Condition on Discharge:  Stable    Vital Signs  Temp:  [98.1 °F (36.7 °C)-98.8 °F (37.1 °C)] 98.1 °F (36.7 °C)  Heart Rate:  [89-92] 89  Resp:  [16-18] 16  BP: (100-112)/(51-56) 100/51    Lab Results   Component Value Date    WBC 11.38 (H) 2017    HGB 8.0 (L) 2017    HCT 24.7 (L) 2017    MCV 86.7 2017     (L) 2017       Discharge Disposition  Home or Self Care    Discharge Medications   Demetria Juarez   Home Medication Instructions RADU:[id number]    Printed on:17 0920   Medication Information                      ibuprofen (ADVIL,MOTRIN) 600 MG tablet  Take 1 tablet by mouth Every 8 (Eight) Hours As Needed for Mild Pain .             oxyCODONE-acetaminophen (PERCOCET) 5-325 MG per tablet  Take 1-2 tablets by mouth Every 4 (Four) Hours As Needed for Severe Pain  for up to 3 days.             Prenatal Vit-Fe Fumarate-FA (PRENATAL 27-1) 27-1 MG tablet tablet  Take 1 tablet by mouth Daily.                 Discharge Diet:     Activity at Discharge:    Activity Instructions     Discharge Activity Restrictions       1) No driving for 2 weeks from delivery and no longer taking narcotics.   2) Do not lift / push / pull more then 10 lbs.       Pelvic Rest       For 6 weeks                  Follow-up Appointments  No future appointments.  Additional Instructions for the Follow-ups that You Need to Schedule     Call MD for problems / concerns.    As directed    Please call with concerns of depression.       Discharge Follow-up with Specified Provider    As directed    To:  dr davison   Follow Up:  2 Weeks       Follow-Up    As directed    with primary care obstetrician     Follow Up Details:  in 2 weeks from delivery                 Elisa Orta, APRN  08/24/17  9:20 AM  Csd

## 2017-08-24 NOTE — PLAN OF CARE
Problem: Patient Care Overview (Adult)  Goal: Plan of Care Review  Outcome: Ongoing (interventions implemented as appropriate)    17 0525   Coping/Psychosocial Response Interventions   Plan Of Care Reviewed With patient   Patient Care Overview   Progress improving       Goal: Adult Individualization and Mutuality  Outcome: Ongoing (interventions implemented as appropriate)  Goal: Discharge Needs Assessment  Outcome: Ongoing (interventions implemented as appropriate)    Problem: Postpartum ( Delivery) (Adult)  Goal: Signs and Symptoms of Listed Potential Problems Will be Absent or Manageable (Postpartum)  Outcome: Ongoing (interventions implemented as appropriate)    17 0525   Postpartum ( Delivery)   Problems Assessed (Postpartum ) all   Problems Present (Postpartum ) none         Problem: Breastfeeding (Adult,NICU,Melvin,Obstetrics,Pediatric)  Goal: Signs and Symptoms of Listed Potential Problems Will be Absent or Manageable (Breastfeeding)  Outcome: Ongoing (interventions implemented as appropriate)    17 0525   Breastfeeding   Problems Assessed (Breastfeeding) all   Problems Present (Breastfeeding) none

## 2017-08-24 NOTE — PLAN OF CARE
Problem: Patient Care Overview (Adult)  Goal: Plan of Care Review  Outcome: Outcome(s) achieved Date Met:  17  Goal: Adult Individualization and Mutuality  Outcome: Outcome(s) achieved Date Met:  17  Goal: Discharge Needs Assessment  Outcome: Outcome(s) achieved Date Met:  17    Problem: Postpartum ( Delivery) (Adult)  Goal: Signs and Symptoms of Listed Potential Problems Will be Absent or Manageable (Postpartum)  Outcome: Outcome(s) achieved Date Met:  17    Problem: Breastfeeding (Adult,NICU,,Obstetrics,Pediatric)  Goal: Signs and Symptoms of Listed Potential Problems Will be Absent or Manageable (Breastfeeding)  Outcome: Outcome(s) achieved Date Met:  17 1036   Breastfeeding   Problems Assessed (Breastfeeding) all   Problems Present (Breastfeeding) none

## 2019-06-27 ENCOUNTER — TRANSCRIBE ORDERS (OUTPATIENT)
Dept: ADMINISTRATIVE | Facility: HOSPITAL | Age: 39
End: 2019-06-27

## 2019-06-27 DIAGNOSIS — N63.11 BREAST LUMP ON RIGHT SIDE AT 10 O'CLOCK POSITION: Primary | ICD-10-CM

## 2019-07-15 ENCOUNTER — HOSPITAL ENCOUNTER (OUTPATIENT)
Dept: MAMMOGRAPHY | Facility: HOSPITAL | Age: 39
Discharge: HOME | End: 2019-07-15
Admitting: OBSTETRICS & GYNECOLOGY

## 2019-07-15 ENCOUNTER — HOSPITAL ENCOUNTER (OUTPATIENT)
Dept: ULTRASOUND IMAGING | Facility: HOSPITAL | Age: 39
Discharge: HOME | End: 2019-07-15

## 2019-07-15 DIAGNOSIS — N63.11 BREAST LUMP ON RIGHT SIDE AT 10 O'CLOCK POSITION: ICD-10-CM

## 2019-07-15 PROCEDURE — 77066 DX MAMMO INCL CAD BI: CPT | Performed by: RADIOLOGY

## 2019-07-15 PROCEDURE — 77066 DX MAMMO INCL CAD BI: CPT

## 2019-07-15 PROCEDURE — 76642 ULTRASOUND BREAST LIMITED: CPT

## 2019-07-15 PROCEDURE — G0279 TOMOSYNTHESIS, MAMMO: HCPCS

## 2019-07-15 PROCEDURE — 76642 ULTRASOUND BREAST LIMITED: CPT | Performed by: RADIOLOGY

## 2019-07-15 PROCEDURE — 77062 BREAST TOMOSYNTHESIS BI: CPT | Performed by: RADIOLOGY

## 2020-04-18 ENCOUNTER — HOSPITAL ENCOUNTER (EMERGENCY)
Facility: HOSPITAL | Age: 40
Discharge: HOME | End: 2020-04-18
Attending: EMERGENCY MEDICINE

## 2020-04-18 ENCOUNTER — APPOINTMENT (OUTPATIENT)
Dept: CT IMAGING | Facility: HOSPITAL | Age: 40
End: 2020-04-18

## 2020-04-18 VITALS
WEIGHT: 150 LBS | RESPIRATION RATE: 16 BRPM | TEMPERATURE: 98 F | DIASTOLIC BLOOD PRESSURE: 81 MMHG | OXYGEN SATURATION: 100 % | SYSTOLIC BLOOD PRESSURE: 107 MMHG | HEIGHT: 66 IN | BODY MASS INDEX: 24.11 KG/M2 | HEART RATE: 72 BPM

## 2020-04-18 DIAGNOSIS — R51.9 PERSISTENT HEADACHES: Primary | ICD-10-CM

## 2020-04-18 PROCEDURE — 99284 EMERGENCY DEPT VISIT MOD MDM: CPT

## 2020-04-18 PROCEDURE — 25010000002 KETOROLAC TROMETHAMINE PER 15 MG: Performed by: PHYSICIAN ASSISTANT

## 2020-04-18 PROCEDURE — 25010000002 DIPHENHYDRAMINE PER 50 MG: Performed by: PHYSICIAN ASSISTANT

## 2020-04-18 PROCEDURE — 70450 CT HEAD/BRAIN W/O DYE: CPT

## 2020-04-18 PROCEDURE — 96374 THER/PROPH/DIAG INJ IV PUSH: CPT

## 2020-04-18 PROCEDURE — 25010000002 DEXAMETHASONE SODIUM PHOSPHATE 10 MG/ML SOLUTION: Performed by: PHYSICIAN ASSISTANT

## 2020-04-18 PROCEDURE — 96375 TX/PRO/DX INJ NEW DRUG ADDON: CPT

## 2020-04-18 PROCEDURE — 25010000002 METOCLOPRAMIDE PER 10 MG: Performed by: PHYSICIAN ASSISTANT

## 2020-04-18 RX ORDER — KETOROLAC TROMETHAMINE 15 MG/ML
15 INJECTION, SOLUTION INTRAMUSCULAR; INTRAVENOUS ONCE
Status: COMPLETED | OUTPATIENT
Start: 2020-04-18 | End: 2020-04-18

## 2020-04-18 RX ORDER — DIPHENHYDRAMINE HYDROCHLORIDE 50 MG/ML
25 INJECTION INTRAMUSCULAR; INTRAVENOUS ONCE
Status: COMPLETED | OUTPATIENT
Start: 2020-04-18 | End: 2020-04-18

## 2020-04-18 RX ORDER — DEXAMETHASONE IN 0.9 % SOD CHL 10 MG/50ML
10 INTRAVENOUS SOLUTION, PIGGYBACK (ML) INTRAVENOUS ONCE
Status: COMPLETED | OUTPATIENT
Start: 2020-04-18 | End: 2020-04-18

## 2020-04-18 RX ORDER — SODIUM CHLORIDE 0.9 % (FLUSH) 0.9 %
10 SYRINGE (ML) INJECTION AS NEEDED
Status: DISCONTINUED | OUTPATIENT
Start: 2020-04-18 | End: 2020-04-18 | Stop reason: HOSPADM

## 2020-04-18 RX ORDER — METOCLOPRAMIDE HYDROCHLORIDE 5 MG/ML
10 INJECTION INTRAMUSCULAR; INTRAVENOUS ONCE
Status: COMPLETED | OUTPATIENT
Start: 2020-04-18 | End: 2020-04-18

## 2020-04-18 RX ADMIN — KETOROLAC TROMETHAMINE 15 MG: 15 INJECTION, SOLUTION INTRAMUSCULAR; INTRAVENOUS at 15:51

## 2020-04-18 RX ADMIN — DIPHENHYDRAMINE HYDROCHLORIDE 25 MG: 50 INJECTION INTRAMUSCULAR; INTRAVENOUS at 15:51

## 2020-04-18 RX ADMIN — DEXAMETHASONE SODIUM PHOSPHATE 10 MG: 10 INJECTION INTRAMUSCULAR; INTRAVENOUS at 16:32

## 2020-04-18 RX ADMIN — SODIUM CHLORIDE 1000 ML: 9 INJECTION, SOLUTION INTRAVENOUS at 15:49

## 2020-04-18 RX ADMIN — METOCLOPRAMIDE 10 MG: 5 INJECTION, SOLUTION INTRAMUSCULAR; INTRAVENOUS at 15:50

## 2020-04-18 NOTE — ED PROVIDER NOTES
Subjective   39-year-old female presents the emergency department with complaints of a headache.  The patient states that she has had a throbbing headache in the right temporal region for about 1-1/2 weeks.  She has a history of migraines but states this feels different to her.  She has seen neurology in the past (2019) for her headaches with no underlying issues found.  No vision changes.  No neck pain.  She has some photophobia.  No history of trauma.  No nausea or vomiting.  She took Motrin earlier today with no relief.  Past medical history of Crohn's disease (on Stelara) and cardiogenic syncope.  She is a non-smoker.  No alcohol or drug use.  Her PCP is Blanka Young.          Review of Systems   Constitutional: Negative for chills and fever.   HENT: Negative for ear pain and sore throat.    Eyes: Negative for visual disturbance.   Respiratory: Negative for cough and shortness of breath.    Cardiovascular: Negative for chest pain.   Gastrointestinal: Negative for abdominal pain, nausea and vomiting.   Endocrine: Negative for polydipsia, polyphagia and polyuria.   Genitourinary: Negative for dysuria.   Musculoskeletal: Negative for back pain, neck pain and neck stiffness.   Skin: Negative for rash.   Allergic/Immunologic: Negative for immunocompromised state.   Neurological: Positive for headaches. Negative for speech difficulty, weakness and numbness.   Hematological: Negative.    Psychiatric/Behavioral: Negative.        Past Medical History:   Diagnosis Date   • Anemia    • Neurocardiogenic syncope    • PONV (postoperative nausea and vomiting)    • Rh incompatibility     rhogam shot-date unknown   • Ulcerative colitis (CMS/HCC)        Allergies   Allergen Reactions   • Ciprofloxacin Rash       Past Surgical History:   Procedure Laterality Date   •  SECTION      times 2   •  SECTION N/A 2017    Procedure:  SECTION REPEAT  PATIENT HAS HX OF NUEROCARDIOGENIC SYNCOPE ;   Surgeon: Dina Campbell MD;  Location: [facility] LABOR DELIVERY;  Service:    • CHOLECYSTECTOMY         Family History   Problem Relation Age of Onset   • Diabetes Father    • Lung cancer Maternal Grandfather    • Breast cancer Paternal Aunt 40   • Breast cancer Paternal Aunt 58   • Endometrial cancer Neg Hx    • Ovarian cancer Neg Hx        Social History     Socioeconomic History   • Marital status:      Spouse name: Not on file   • Number of children: Not on file   • Years of education: Not on file   • Highest education level: Not on file   Tobacco Use   • Smoking status: Never Smoker   • Smokeless tobacco: Never Used   Substance and Sexual Activity   • Alcohol use: No   • Drug use: No   • Sexual activity: Defer           Objective   Physical Exam   Constitutional: She is oriented to person, place, and time. She appears well-developed and well-nourished. No distress.   HENT:   Head: Normocephalic.   Nose: Nose normal.   Mouth/Throat: Oropharynx is clear and moist.   Eyes: Pupils are equal, round, and reactive to light. Conjunctivae are normal.   Neck: Normal range of motion. Neck supple.   No meningeal signs   Cardiovascular: Normal rate, regular rhythm, normal heart sounds and intact distal pulses.   No murmur heard.  Pulmonary/Chest: Effort normal and breath sounds normal. No respiratory distress.   Abdominal: Soft. Bowel sounds are normal. There is no tenderness.   Musculoskeletal: Normal range of motion. She exhibits no tenderness.   Neurological: She is oriented to person, place, and time.   Skin: Skin is warm and dry.   Psychiatric: She has a normal mood and affect.       Procedures      CT head shows nothing acute.  The pt was given a migraine cocktail of Toradol, IV fluids, Reglan, Benadryl and Decadron.  She states she got relief of her headache and wants to go home.             ED Course                                           MDM    Final diagnoses:   Persistent headaches            Demetris  BRUNO Aguilar  04/18/20 1944

## 2020-07-17 ENCOUNTER — TRANSCRIBE ORDERS (OUTPATIENT)
Dept: MAMMOGRAPHY | Facility: HOSPITAL | Age: 40
End: 2020-07-17

## 2020-07-17 DIAGNOSIS — Z12.31 VISIT FOR SCREENING MAMMOGRAM: Primary | ICD-10-CM

## 2020-08-10 ENCOUNTER — HOSPITAL ENCOUNTER (OUTPATIENT)
Dept: MAMMOGRAPHY | Facility: HOSPITAL | Age: 40
Discharge: HOME | End: 2020-08-10
Admitting: OBSTETRICS & GYNECOLOGY

## 2020-08-10 DIAGNOSIS — Z12.31 VISIT FOR SCREENING MAMMOGRAM: ICD-10-CM

## 2020-08-10 PROCEDURE — 77063 BREAST TOMOSYNTHESIS BI: CPT | Performed by: RADIOLOGY

## 2020-08-10 PROCEDURE — 77067 SCR MAMMO BI INCL CAD: CPT

## 2020-08-10 PROCEDURE — 77063 BREAST TOMOSYNTHESIS BI: CPT

## 2020-08-10 PROCEDURE — 77067 SCR MAMMO BI INCL CAD: CPT | Performed by: RADIOLOGY

## 2020-10-06 ENCOUNTER — TELEPHONE (OUTPATIENT)
Dept: OBSTETRICS AND GYNECOLOGY | Facility: CLINIC | Age: 40
End: 2020-10-06

## 2020-10-06 NOTE — TELEPHONE ENCOUNTER
Patient called stating she needs surgery, asking who she was referred to. & wanting to know if OP can do surgery. I looked in patient chart about this and could not find anything .

## 2020-10-09 NOTE — TELEPHONE ENCOUNTER
BREANA discussed with Dr. Veras yesterday and he said she was sent to a generalist and he would be able to do this surgery. He told BREANA he would have his office give her a call to set up an appointment. Pt. Notified of this, instructed if no call by Wednesday of next call back. She VU

## 2021-04-16 ENCOUNTER — TELEPHONE (OUTPATIENT)
Dept: OBSTETRICS AND GYNECOLOGY | Facility: CLINIC | Age: 41
End: 2021-04-16

## 2021-04-16 ENCOUNTER — OFFICE VISIT (OUTPATIENT)
Dept: OBSTETRICS AND GYNECOLOGY | Facility: CLINIC | Age: 41
End: 2021-04-16

## 2021-04-16 VITALS — SYSTOLIC BLOOD PRESSURE: 118 MMHG | DIASTOLIC BLOOD PRESSURE: 76 MMHG | BODY MASS INDEX: 25.31 KG/M2 | WEIGHT: 156.8 LBS

## 2021-04-16 DIAGNOSIS — N76.0 ACUTE VAGINITIS: Primary | ICD-10-CM

## 2021-04-16 PROCEDURE — 87210 SMEAR WET MOUNT SALINE/INK: CPT | Performed by: NURSE PRACTITIONER

## 2021-04-16 PROCEDURE — 99213 OFFICE O/P EST LOW 20 MIN: CPT | Performed by: NURSE PRACTITIONER

## 2021-04-16 PROCEDURE — 87220 TISSUE EXAM FOR FUNGI: CPT | Performed by: NURSE PRACTITIONER

## 2021-04-16 RX ORDER — USTEKINUMAB 45 MG/.5ML
INJECTION, SOLUTION SUBCUTANEOUS
COMMUNITY
End: 2022-02-11

## 2021-04-16 NOTE — PROGRESS NOTES
Chief Complaint   Patient presents with   • Follow-up     vaginal burning       Subjective   HPI  Demetria Juarez is a 40 y.o. female, , who presents for vaginal burning.      She states she has experienced this problem for 1.5 weeks.  She describes the severity as moderate.  She states that the problem is worsening.  The patient reports additional symptoms as discharge and vaginal itching, burning.    Patient states she is all around uncomfortable. She has complaints of vaginal itching, burning and discharge. She states the discharge isn't very much and the burning is more intense after intercourse.    Additional OB/GYN History   Current contraception: contraceptive methods: OCP (estrogen/progesterone)  Desires to: continue contraception  Last Pap :   Last Completed Pap Smear       Status Date      PAP SMEAR No completions recorded        History of abnormal Pap smear: no  Last mammogram:   Last Completed Mammogram    Patient has no health maintenance due at this time       Tobacco Usage?: No   OB History        3    Para   3    Term   3            AB        Living   3       SAB        TAB        Ectopic        Molar        Multiple   0    Live Births   3                Health Maintenance   Topic Date Due   • Annual Gynecologic Pelvic and Breast Exam  Never done   • ANNUAL PHYSICAL  Never done   • COVID-19 Vaccine (1) Never done   • HEPATITIS C SCREENING  Never done   • PAP SMEAR  Never done   • INFLUENZA VACCINE  2021   • TDAP/TD VACCINES (2 - Td) 2024   • Pneumococcal Vaccine 0-64  Aged Out       The additional following portions of the patient's history were reviewed and updated as appropriate: allergies, current medications, past family history, past medical history, past social history and past surgical history.    Review of Systems   Constitutional: Negative.    HENT: Negative.    Eyes: Negative.    Respiratory: Negative.    Cardiovascular: Negative.     Gastrointestinal: Negative.    Endocrine: Negative.    Genitourinary: Positive for vaginal discharge.        Vaginal burning  Vaginal itching   Musculoskeletal: Negative.    Skin: Negative.    Allergic/Immunologic: Negative.    Neurological: Negative.    Hematological: Negative.    Psychiatric/Behavioral: Negative.        I have reviewed and agree with the HPI, ROS, and historical information as entered above. Yanet Lozano, APRN    Objective   /76   Wt 71.1 kg (156 lb 12.8 oz)   LMP 03/15/2021   BMI 25.31 kg/m²     Physical Exam  Vitals and nursing note reviewed. Exam conducted with a chaperone present.   Constitutional:       Appearance: Normal appearance.   Pulmonary:      Effort: Pulmonary effort is normal.   Abdominal:      Palpations: Abdomen is soft.   Genitourinary:     Labia:         Right: No rash, tenderness or lesion.         Left: No rash, tenderness or lesion.       Vagina: Normal. No lesions.      Cervix: No cervical motion tenderness, discharge, lesion or cervical bleeding.      Uterus: Normal. Not enlarged, not fixed and not tender.       Adnexa:         Right: No mass or tenderness.          Left: No mass or tenderness.        Rectum: No external hemorrhoid.      Comments: Chaperone Present  Neurological:      Mental Status: She is alert.         Assessment/Plan     Assessment     Problem List Items Addressed This Visit     None      Visit Diagnoses     Acute vaginitis    -  Primary    Relevant Orders    Candida panel, PCR - Swab, Vagina    Bacterial Vaginosis, VINNY - Swab, Vagina    POC Wet Prep (Completed)    POC KOH Prep (Completed)          1. Wet prep/KOH negative.  Culture sent    Plan     Return for Annual physical.  2.  We will call with culture results.  We discussed vulvovaginal hygiene and appropriate products.      Yanet Lozano, JULI  04/16/2021

## 2021-04-16 NOTE — TELEPHONE ENCOUNTER
Pt started having vaginal burning approximately 1 week ago that has worsened.  It is worse during IC.  C/o slight itching as well.  She has not used OTC treatments and doesn't currently have Rx for diflucan.

## 2021-04-16 NOTE — TELEPHONE ENCOUNTER
Patient is experiencing vaginal burning during and after intercourse. Last week she thought she was getting a yeast infection and all that resulted from it was this vaginal burning. History of frequent yeast infections and had a yearly script of Diflucan.

## 2021-04-19 LAB
KOH PREP NAIL: NORMAL
WET PREP GENITAL: NORMAL

## 2021-04-21 LAB
A VAGINAE DNA VAG QL NAA+PROBE: NORMAL SCORE
BVAB2 DNA VAG QL NAA+PROBE: NORMAL SCORE
C ALBICANS DNA VAG QL NAA+PROBE: NEGATIVE
C GLABRATA DNA VAG QL NAA+PROBE: NEGATIVE
C KRUSEI DNA VAG QL NAA+PROBE: NEGATIVE
C LUSITANIAE DNA VAG QL NAA+PROBE: NEGATIVE
CANDIDA DNA VAG QL NAA+PROBE: NEGATIVE
MEGA1 DNA VAG QL NAA+PROBE: NORMAL SCORE

## 2022-02-10 PROBLEM — Z90.710 HISTORY OF HYSTERECTOMY: Status: ACTIVE | Noted: 2022-02-10

## 2022-02-11 ENCOUNTER — OFFICE VISIT (OUTPATIENT)
Dept: OBSTETRICS AND GYNECOLOGY | Facility: CLINIC | Age: 42
End: 2022-02-11

## 2022-02-11 VITALS
DIASTOLIC BLOOD PRESSURE: 76 MMHG | BODY MASS INDEX: 26.68 KG/M2 | SYSTOLIC BLOOD PRESSURE: 102 MMHG | WEIGHT: 166 LBS | HEIGHT: 66 IN

## 2022-02-11 DIAGNOSIS — R30.0 DYSURIA: Primary | ICD-10-CM

## 2022-02-11 DIAGNOSIS — N94.10 FEMALE DYSPAREUNIA: ICD-10-CM

## 2022-02-11 DIAGNOSIS — N94.9 VAGINAL BURNING: ICD-10-CM

## 2022-02-11 LAB
BILIRUB BLD-MCNC: NEGATIVE MG/DL
CLARITY, POC: CLEAR
COLOR UR: YELLOW
GLUCOSE UR STRIP-MCNC: NEGATIVE MG/DL
KETONES UR QL: NEGATIVE
LEUKOCYTE EST, POC: NEGATIVE
NITRITE UR-MCNC: NEGATIVE MG/ML
PH UR: 5.5 [PH] (ref 5–8)
PROT UR STRIP-MCNC: NEGATIVE MG/DL
RBC # UR STRIP: NEGATIVE /UL
SP GR UR: 1.03 (ref 1–1.03)
UROBILINOGEN UR QL: ABNORMAL

## 2022-02-11 PROCEDURE — 99213 OFFICE O/P EST LOW 20 MIN: CPT | Performed by: OBSTETRICS & GYNECOLOGY

## 2022-02-11 PROCEDURE — 81002 URINALYSIS NONAUTO W/O SCOPE: CPT | Performed by: OBSTETRICS & GYNECOLOGY

## 2022-02-11 RX ORDER — ESTRADIOL 0.1 MG/G
CREAM VAGINAL
Qty: 1 EACH | Refills: 0 | Status: SHIPPED | OUTPATIENT
Start: 2022-02-11 | End: 2022-05-18

## 2022-02-11 RX ORDER — USTEKINUMAB 90 MG/ML
INJECTION, SOLUTION SUBCUTANEOUS
COMMUNITY
Start: 2022-01-07

## 2022-02-11 NOTE — PROGRESS NOTES
Chief Complaint   Patient presents with   • Vaginal Pain         Subjective   HPI  Demetria Juarez is a 41 y.o. female, , who presents with evaluation of burning after IC, dyspareunia, and post coital bleeding that is initial.      She states she has experienced this problem for 2 days.  She describes the severity as worsening.  Patient notes aggravating factors include intercourse and alleviating factors are none.   The patient has not previously been evaluated for vaginitis. She reports she has felt vaginal irritation for several days but never thought anything of it as she has not had much itching or d/c. She was concerned when she started having the pain and bleeding. She reports the burning after IC is worse with urination.      Her last LMP was Patient's last menstrual period was 03/15/2021..  She is s/p TLH/BS 2021 with Derek Veras for AUB. Partner Status: Marital Status: .  New Partners since last visit: no.  Desires STD Screening: no.    Additional OB/GYN History   Last Pap :   Last Completed Pap Smear          PAP SMEAR (Every 3 Years) Next due on 2/10/2023    02/10/2020  Done - negative              History of abnormal Pap smear: no  OB History        3    Para   3    Term   3            AB        Living   3       SAB        IAB        Ectopic        Molar        Multiple   0    Live Births   3                The additional following portions of the patient's history were reviewed and updated as appropriate: allergies, current medications, past family history, past medical history, past social history, past surgical history and problem list.    Review of Systems   Constitutional: Negative.    HENT: Negative.    Eyes: Negative.    Respiratory: Negative.    Cardiovascular: Negative.    Gastrointestinal: Negative.    Endocrine: Negative.    Genitourinary: Positive for dyspareunia, vaginal bleeding and vaginal pain.   Musculoskeletal: Negative.    Skin: Negative.   "  Allergic/Immunologic: Negative.    Neurological: Negative.    Hematological: Negative.    Psychiatric/Behavioral: Negative.      All other systems reviewed and are negative.     I have reviewed and agree with the HPI, ROS, and historical information as entered above. Dina Campbell MD    Objective   /76   Ht 167.6 cm (66\")   Wt 75.3 kg (166 lb)   LMP 03/15/2021   BMI 26.79 kg/m²     Physical Exam  Vitals and nursing note reviewed. Exam conducted with a chaperone present.   Genitourinary:     General: Normal vulva.      Exam position: Lithotomy position.      Labia:         Right: No rash, tenderness or lesion.         Left: No rash, tenderness or lesion.       Urethra: No urethral pain, urethral swelling or urethral lesion.      Vagina: Normal. No tenderness or lesions.      Uterus: Absent.       Adnexa:         Right: No mass, tenderness or fullness.          Left: No mass, tenderness or fullness.        Rectum: No external hemorrhoid.          Comments: Chaperone Present.  pH within normal limits.  Small tear at vaginal introitus            Assessment/Plan     Assessment and Plan    Problem List Items Addressed This Visit     None      Visit Diagnoses     Dysuria    -  Primary    Relevant Orders    POC Urinalysis Dipstick (Completed)    Vaginal burning        Relevant Orders    NuSwab BV & Candida - Swab, Vagina    Female dyspareunia        Relevant Medications    estradiol (ESTRACE VAGINAL) 0.1 MG/GM vaginal cream    Other Relevant Orders    NuSwab BV & Candida - Swab, Vagina          1. Most likely intercourse uncomfortable due to vaginal dryness, that then led to some tearing.  pH normal today.  We will send new swab for yeast or bacterial vaginosis, although low suspicion.  Will send with Estrace cream to help bulk up the tissue.  Advised patient to use lubricant with intercourse.  This is most likely been a change since she had her cervix removed with hysterectomy.  Return if symptoms " worsen or fail to improve, for Annual physical.        Dina Campbell MD  02/11/2022

## 2022-02-15 LAB
A VAGINAE DNA VAG QL NAA+PROBE: ABNORMAL SCORE
BVAB2 DNA VAG QL NAA+PROBE: ABNORMAL SCORE
C ALBICANS DNA VAG QL NAA+PROBE: POSITIVE
C GLABRATA DNA VAG QL NAA+PROBE: NEGATIVE
MEGA1 DNA VAG QL NAA+PROBE: ABNORMAL SCORE

## 2022-02-22 DIAGNOSIS — B37.9 YEAST INFECTION: Primary | ICD-10-CM

## 2022-02-22 RX ORDER — FLUCONAZOLE 150 MG/1
150 TABLET ORAL DAILY
Qty: 2 TABLET | Refills: 0 | Status: SHIPPED | OUTPATIENT
Start: 2022-02-22 | End: 2022-07-14

## 2022-05-16 DIAGNOSIS — N94.10 FEMALE DYSPAREUNIA: ICD-10-CM

## 2022-05-18 RX ORDER — ESTRADIOL 0.1 MG/G
CREAM VAGINAL
Qty: 42.5 G | Refills: 2 | Status: SHIPPED | OUTPATIENT
Start: 2022-05-18

## 2022-07-14 ENCOUNTER — OFFICE VISIT (OUTPATIENT)
Dept: FAMILY MEDICINE CLINIC | Facility: CLINIC | Age: 42
End: 2022-07-14

## 2022-07-14 VITALS
WEIGHT: 164.1 LBS | HEIGHT: 66 IN | DIASTOLIC BLOOD PRESSURE: 80 MMHG | OXYGEN SATURATION: 98 % | SYSTOLIC BLOOD PRESSURE: 130 MMHG | BODY MASS INDEX: 26.37 KG/M2 | HEART RATE: 68 BPM

## 2022-07-14 DIAGNOSIS — L02.224 BOIL OF GROIN: Primary | ICD-10-CM

## 2022-07-14 PROCEDURE — 99213 OFFICE O/P EST LOW 20 MIN: CPT | Performed by: PHYSICIAN ASSISTANT

## 2022-07-14 RX ORDER — SULFAMETHOXAZOLE AND TRIMETHOPRIM 800; 160 MG/1; MG/1
1 TABLET ORAL 2 TIMES DAILY
Qty: 20 TABLET | Refills: 0 | Status: SHIPPED | OUTPATIENT
Start: 2022-07-14 | End: 2022-07-24

## 2022-07-14 NOTE — PROGRESS NOTES
"Chief Complaint  Cyst (Boil around bikini line, noticed 3 days ago.)    Subjective        Demetria Juarez presents to [facility]  History of Present Illness  Patient states she noticed a boil 3 days ago on the right side of her inner thigh along her bikini line.  She states that has been mostly annoying since.  She has not noticed any drainage from it.  She has not had any fever.  She has not placed any warm compresses on the area.  She notes that it is quite sore.      Objective   Vital Signs:  /80   Pulse 68   Ht 167.6 cm (66\")   Wt 74.4 kg (164 lb 1.6 oz)   SpO2 98%   BMI 26.49 kg/m²   Estimated body mass index is 26.49 kg/m² as calculated from the following:    Height as of this encounter: 167.6 cm (66\").    Weight as of this encounter: 74.4 kg (164 lb 1.6 oz).          Physical Exam  Genitourinary:           Result Review :                Assessment and Plan   Diagnoses and all orders for this visit:    1. Boil of groin (Primary)  -     sulfamethoxazole-trimethoprim (Bactrim DS) 800-160 MG per tablet; Take 1 tablet by mouth 2 (Two) Times a Day for 10 days.  Dispense: 20 tablet; Refill: 0    Arranged surgical visit for patient tomorrow to have area I indeed due to its size and placement.  In the meantime we will have her placed on Bactrim.  We also encouraged warm compresses to the area.         Follow Up   No follow-ups on file.  Patient was given instructions and counseling regarding her condition or for health maintenance advice. Please see specific information pulled into the AVS if appropriate.       "

## 2022-08-19 ENCOUNTER — TRANSCRIBE ORDERS (OUTPATIENT)
Dept: ADMINISTRATIVE | Facility: HOSPITAL | Age: 42
End: 2022-08-19

## 2022-08-19 DIAGNOSIS — Z12.31 VISIT FOR SCREENING MAMMOGRAM: Primary | ICD-10-CM

## 2022-08-23 ENCOUNTER — HOSPITAL ENCOUNTER (OUTPATIENT)
Dept: MAMMOGRAPHY | Facility: HOSPITAL | Age: 42
Discharge: HOME | End: 2022-08-23
Admitting: OBSTETRICS & GYNECOLOGY

## 2022-08-23 DIAGNOSIS — Z12.31 VISIT FOR SCREENING MAMMOGRAM: ICD-10-CM

## 2022-08-23 PROCEDURE — 77063 BREAST TOMOSYNTHESIS BI: CPT

## 2022-08-23 PROCEDURE — 77067 SCR MAMMO BI INCL CAD: CPT | Performed by: RADIOLOGY

## 2022-08-23 PROCEDURE — 77063 BREAST TOMOSYNTHESIS BI: CPT | Performed by: RADIOLOGY

## 2022-08-23 PROCEDURE — 77067 SCR MAMMO BI INCL CAD: CPT

## 2022-09-27 ENCOUNTER — OFFICE VISIT (OUTPATIENT)
Dept: FAMILY MEDICINE CLINIC | Facility: CLINIC | Age: 42
End: 2022-09-27

## 2022-09-27 VITALS
SYSTOLIC BLOOD PRESSURE: 118 MMHG | BODY MASS INDEX: 26.84 KG/M2 | HEIGHT: 66 IN | WEIGHT: 167 LBS | HEART RATE: 60 BPM | DIASTOLIC BLOOD PRESSURE: 80 MMHG | OXYGEN SATURATION: 99 %

## 2022-09-27 DIAGNOSIS — R07.81 RIB PAIN ON LEFT SIDE: ICD-10-CM

## 2022-09-27 DIAGNOSIS — D17.1 LIPOMA OF ANTERIOR CHEST WALL: Primary | ICD-10-CM

## 2022-09-27 PROBLEM — K50.10 CROHN'S COLITIS: Status: ACTIVE | Noted: 2021-07-28

## 2022-09-27 PROCEDURE — 99213 OFFICE O/P EST LOW 20 MIN: CPT | Performed by: PHYSICIAN ASSISTANT

## 2022-09-27 RX ORDER — FAMOTIDINE 40 MG/1
TABLET, FILM COATED ORAL
COMMUNITY
Start: 2022-09-21

## 2023-02-15 ENCOUNTER — OFFICE VISIT (OUTPATIENT)
Dept: FAMILY MEDICINE CLINIC | Facility: CLINIC | Age: 43
End: 2023-02-15
Payer: COMMERCIAL

## 2023-02-15 VITALS
OXYGEN SATURATION: 99 % | SYSTOLIC BLOOD PRESSURE: 120 MMHG | BODY MASS INDEX: 26.03 KG/M2 | HEART RATE: 61 BPM | DIASTOLIC BLOOD PRESSURE: 80 MMHG | WEIGHT: 162 LBS | HEIGHT: 66 IN

## 2023-02-15 DIAGNOSIS — K50.10 CROHN'S DISEASE OF COLON WITHOUT COMPLICATION: ICD-10-CM

## 2023-02-15 DIAGNOSIS — E56.9 VITAMIN DEFICIENCY: ICD-10-CM

## 2023-02-15 DIAGNOSIS — K21.9 GASTROESOPHAGEAL REFLUX DISEASE WITHOUT ESOPHAGITIS: ICD-10-CM

## 2023-02-15 DIAGNOSIS — Z00.00 ROUTINE MEDICAL EXAM: Primary | ICD-10-CM

## 2023-02-15 DIAGNOSIS — Z13.1 SCREENING FOR DIABETES MELLITUS: ICD-10-CM

## 2023-02-15 DIAGNOSIS — R10.13 EPIGASTRIC PAIN: ICD-10-CM

## 2023-02-15 DIAGNOSIS — Z13.220 SCREENING FOR LIPID DISORDERS: ICD-10-CM

## 2023-02-15 PROCEDURE — 36415 COLL VENOUS BLD VENIPUNCTURE: CPT | Performed by: NURSE PRACTITIONER

## 2023-02-15 PROCEDURE — 99396 PREV VISIT EST AGE 40-64: CPT | Performed by: NURSE PRACTITIONER

## 2023-02-15 RX ORDER — DICYCLOMINE HYDROCHLORIDE 10 MG/1
CAPSULE ORAL EVERY 8 HOURS SCHEDULED
COMMUNITY

## 2023-02-15 RX ORDER — ONDANSETRON 4 MG/1
4 TABLET, ORALLY DISINTEGRATING ORAL EVERY 6 HOURS PRN
COMMUNITY
Start: 2023-01-29 | End: 2023-02-28

## 2023-02-15 NOTE — PROGRESS NOTES
"Chief Complaint  Follow-up (From seeing GI drAndreia With abnormal bw results) and Annual Exam    Subjective          Demetria Juarez presents to [facility] for preventative yearly exam.   History of Present Illness  Pt is here for a physical exam and to discuss abdominal pain. She and a few family members had a stomach bug nearly 1 month ago. She had persistent epigastric pain, appetite loss, and nausea following. She went to the  ER on 1/29 and had labs and CT. Her lipase and ALT were slightly elevated. She feels that her symptoms are just now beginning to improve. She has Crohn's and is followed by GI.       Objective   Vital Signs:   /80   Pulse 61   Ht 167.6 cm (66\")   Wt 73.5 kg (162 lb)   SpO2 99%   BMI 26.15 kg/m²     Body mass index is 26.15 kg/m².    Predictive Model Details   No score data available for Risk of Fall        PHQ-9 Depression Screening  Little interest or pleasure in doing things?     Feeling down, depressed, or hopeless?     Trouble falling or staying asleep, or sleeping too much?     Feeling tired or having little energy?     Poor appetite or overeating?     Feeling bad about yourself - or that you are a failure or have let yourself or your family down?     Trouble concentrating on things, such as reading the newspaper or watching television?     Moving or speaking so slowly that other people could have noticed? Or the opposite - being so fidgety or restless that you have been moving around a lot more than usual?     Thoughts that you would be better off dead, or of hurting yourself in some way?     PHQ-9 Total Score     If you checked off any problems, how difficult have these problems made it for you to do your work, take care of things at home, or get along with other people?       Health Maintenance   Topic Date Due   • ANNUAL PHYSICAL  Never done   • PAP SMEAR  02/10/2023   • COVID-19 Vaccine (2 - Pfizer series) 02/17/2023 (Originally " 2021)   • INFLUENZA VACCINE  2023 (Originally 2022)   • TDAP/TD VACCINES (2 - Td or Tdap) 2024   • HEPATITIS C SCREENING  Completed   • Pneumococcal Vaccine 0-64  Aged Out        Immunization History   Administered Date(s) Administered   • COVID-19 (PFIZER) PURPLE CAP 2021   • Rho (D) Immune Globulin 2017   • Tdap 2014       Review of Systems   Constitutional: Negative for fatigue and fever.   Respiratory: Negative for shortness of breath.    Cardiovascular: Negative for chest pain, palpitations and leg swelling.   Gastrointestinal: Positive for abdominal pain.   Neurological: Negative for syncope.   Psychiatric/Behavioral: The patient is not nervous/anxious.         Past History:  Medical History: has a past medical history of Anemia, Crohn disease (HCC), Fibroid uterus (2013), History of mammogram (07/15/2019), Hydrosalpinx, IBS (irritable bowel syndrome) (), Leiomyoma of uterus (2013), Neurocardiogenic syncope, Papanicolaou smear (2018), PONV (postoperative nausea and vomiting), Rh incompatibility, and Ulcerative colitis ().   Surgical History: has a past surgical history that includes  section (,,); Cholecystectomy (2013);  section (N/A, 2017); Colonoscopy (12/10/2019); and Laparoscopic total hysterectomy.   Family History: family history includes Breast cancer (age of onset: 40) in her paternal aunt; Breast cancer (age of onset: 58) in her paternal aunt; Diabetes in her father; Esophageal cancer in her maternal uncle; Lung cancer in her maternal grandfather; Ovarian cancer in her paternal aunt; Stomach cancer in her paternal uncle.   Social History: reports that she has never smoked. She has never used smokeless tobacco. She reports that she does not drink alcohol and does not use drugs.       Allergies: Adhesive tape and Ciprofloxacin    Physical Exam  Constitutional:       Appearance: Normal appearance.   HENT:       Head: Normocephalic.   Eyes:      Conjunctiva/sclera: Conjunctivae normal.      Pupils: Pupils are equal, round, and reactive to light.   Cardiovascular:      Rate and Rhythm: Normal rate and regular rhythm.      Heart sounds: Normal heart sounds.   Pulmonary:      Effort: Pulmonary effort is normal.      Breath sounds: Normal breath sounds.   Abdominal:      Tenderness: There is no abdominal tenderness.      Comments: Epigastric tenderness   Musculoskeletal:         General: Normal range of motion.   Skin:     General: Skin is warm and dry.      Capillary Refill: Capillary refill takes less than 2 seconds.   Neurological:      General: No focal deficit present.      Mental Status: She is alert and oriented to person, place, and time.   Psychiatric:         Mood and Affect: Mood normal.         Behavior: Behavior normal.         Thought Content: Thought content normal.         Judgment: Judgment normal.          Result Review :                   Assessment and Plan    Diagnoses and all orders for this visit:    1. Routine medical exam (Primary)  Comments:  We discussed diet, exercise, and preventative counseling.  Labs drawn.  Patient is up-to-date on Pap and mammogram.  Orders:  -     CBC & Differential; Future  -     Comprehensive Metabolic Panel; Future  -     TSH; Future  -     CBC & Differential  -     Comprehensive Metabolic Panel  -     TSH    2. Screening for diabetes mellitus  -     Hemoglobin A1c; Future  -     Hemoglobin A1c    3. Screening for lipid disorders  -     Lipid Panel; Future  -     Lipid Panel    4. Vitamin deficiency  -     Vitamin B12; Future  -     Vitamin D,25-Hydroxy; Future  -     Folate; Future  -     Vitamin B12  -     Vitamin D,25-Hydroxy  -     Folate    5. Gastroesophageal reflux disease without esophagitis  Comments:  Continue current medications.  Follow-up with GI.    6. Epigastric pain  Comments:  Labs repeated. F/U with GI.   Orders:  -     Amylase; Future  -     Lipase;  Future  -     Amylase  -     Lipase    7. Crohn's disease of colon without complication (HCC)  Comments:  Follow-up with GI                Current Outpatient Medications:   •  dicyclomine (BENTYL) 10 MG capsule, Every 8 (Eight) Hours., Disp: , Rfl:   •  famotidine (PEPCID) 40 MG tablet, , Disp: , Rfl:   •  ondansetron ODT (ZOFRAN-ODT) 4 MG disintegrating tablet, Take 4 mg by mouth Every 6 (Six) Hours As Needed., Disp: , Rfl:   •  Stelara 90 MG/ML solution prefilled syringe Injection, , Disp: , Rfl:   •  estradiol (ESTRACE) 0.1 MG/GM vaginal cream, APPLY A PEA SIZED AMOUNT AT THE VAGINAL OPENING NIGHTLY FOR 1 WEEK FOLLOWED BY EVERY OTHER DAY OF THE WEEK, Disp: 42.5 g, Rfl: 2    Follow Up   No follow-ups on file.  Patient was given instructions and counseling regarding her condition or for health maintenance advice. Please see specific information pulled into the AVS if appropriate.     JULI Harden

## 2023-02-16 LAB
25(OH)D3+25(OH)D2 SERPL-MCNC: 29.9 NG/ML (ref 30–100)
ALBUMIN SERPL-MCNC: 4.3 G/DL (ref 3.8–4.8)
ALBUMIN/GLOB SERPL: 2 {RATIO} (ref 1.2–2.2)
ALP SERPL-CCNC: 69 IU/L (ref 44–121)
ALT SERPL-CCNC: 23 IU/L (ref 0–32)
AMYLASE SERPL-CCNC: 48 U/L (ref 31–110)
AST SERPL-CCNC: 17 IU/L (ref 0–40)
BASOPHILS # BLD AUTO: 0 X10E3/UL (ref 0–0.2)
BASOPHILS NFR BLD AUTO: 1 %
BILIRUB SERPL-MCNC: 0.4 MG/DL (ref 0–1.2)
BUN SERPL-MCNC: 8 MG/DL (ref 6–24)
BUN/CREAT SERPL: 9 (ref 9–23)
CALCIUM SERPL-MCNC: 9.2 MG/DL (ref 8.7–10.2)
CHLORIDE SERPL-SCNC: 105 MMOL/L (ref 96–106)
CHOLEST SERPL-MCNC: 166 MG/DL (ref 100–199)
CO2 SERPL-SCNC: 23 MMOL/L (ref 20–29)
CREAT SERPL-MCNC: 0.9 MG/DL (ref 0.57–1)
EGFRCR SERPLBLD CKD-EPI 2021: 81 ML/MIN/1.73
EOSINOPHIL # BLD AUTO: 0.1 X10E3/UL (ref 0–0.4)
EOSINOPHIL NFR BLD AUTO: 4 %
ERYTHROCYTE [DISTWIDTH] IN BLOOD BY AUTOMATED COUNT: 12.4 % (ref 11.7–15.4)
FOLATE SERPL-MCNC: 15.5 NG/ML
GLOBULIN SER CALC-MCNC: 2.2 G/DL (ref 1.5–4.5)
GLUCOSE SERPL-MCNC: 87 MG/DL (ref 70–99)
HBA1C MFR BLD: 5.4 % (ref 4.8–5.6)
HCT VFR BLD AUTO: 39.2 % (ref 34–46.6)
HDLC SERPL-MCNC: 65 MG/DL
HGB BLD-MCNC: 13.1 G/DL (ref 11.1–15.9)
IMM GRANULOCYTES # BLD AUTO: 0 X10E3/UL (ref 0–0.1)
IMM GRANULOCYTES NFR BLD AUTO: 0 %
LDLC SERPL CALC-MCNC: 88 MG/DL (ref 0–99)
LIPASE SERPL-CCNC: 65 U/L (ref 14–72)
LYMPHOCYTES # BLD AUTO: 1.5 X10E3/UL (ref 0.7–3.1)
LYMPHOCYTES NFR BLD AUTO: 37 %
MCH RBC QN AUTO: 29.4 PG (ref 26.6–33)
MCHC RBC AUTO-ENTMCNC: 33.4 G/DL (ref 31.5–35.7)
MCV RBC AUTO: 88 FL (ref 79–97)
MONOCYTES # BLD AUTO: 0.3 X10E3/UL (ref 0.1–0.9)
MONOCYTES NFR BLD AUTO: 8 %
NEUTROPHILS # BLD AUTO: 2 X10E3/UL (ref 1.4–7)
NEUTROPHILS NFR BLD AUTO: 50 %
PLATELET # BLD AUTO: 191 X10E3/UL (ref 150–450)
POTASSIUM SERPL-SCNC: 4.3 MMOL/L (ref 3.5–5.2)
PROT SERPL-MCNC: 6.5 G/DL (ref 6–8.5)
RBC # BLD AUTO: 4.46 X10E6/UL (ref 3.77–5.28)
SODIUM SERPL-SCNC: 141 MMOL/L (ref 134–144)
TRIGL SERPL-MCNC: 67 MG/DL (ref 0–149)
TSH SERPL DL<=0.005 MIU/L-ACNC: 3.19 UIU/ML (ref 0.45–4.5)
VIT B12 SERPL-MCNC: 367 PG/ML (ref 232–1245)
VLDLC SERPL CALC-MCNC: 13 MG/DL (ref 5–40)
WBC # BLD AUTO: 4 X10E3/UL (ref 3.4–10.8)

## 2023-02-19 NOTE — PROGRESS NOTES
Your vitamin D was low so take 2,000 units of vitamin D daily. Everything else looked good.  Take care!

## 2023-06-05 ENCOUNTER — PATIENT MESSAGE (OUTPATIENT)
Dept: FAMILY MEDICINE CLINIC | Facility: CLINIC | Age: 43
End: 2023-06-05
Payer: COMMERCIAL

## 2023-06-05 ENCOUNTER — TELEPHONE (OUTPATIENT)
Dept: OBSTETRICS AND GYNECOLOGY | Facility: CLINIC | Age: 43
End: 2023-06-05
Payer: COMMERCIAL

## 2023-06-05 NOTE — TELEPHONE ENCOUNTER
Provider: DR. SAL  Caller: EARLINE NGUYEN  Relationship to Patient: SELF  Pharmacy: [facility] @ [facility][note 3] [address] ON FILE  Phone Number: [phone]  Reason for Call: PT POSS HAS YEAST INFECTION. DISCHARGE AND ITCHING.   COULD SOMETHING BE CALLED IN FOR HER?    When was the patient last seen: 2-22  SHE IS LEAVING FRIDAY FOR VACATION

## 2023-10-09 ENCOUNTER — OFFICE VISIT (OUTPATIENT)
Dept: OBSTETRICS AND GYNECOLOGY | Facility: CLINIC | Age: 43
End: 2023-10-09
Payer: COMMERCIAL

## 2023-10-09 VITALS
SYSTOLIC BLOOD PRESSURE: 100 MMHG | BODY MASS INDEX: 27.16 KG/M2 | DIASTOLIC BLOOD PRESSURE: 68 MMHG | HEIGHT: 66 IN | WEIGHT: 169 LBS

## 2023-10-09 DIAGNOSIS — N89.8 VAGINAL ODOR: ICD-10-CM

## 2023-10-09 DIAGNOSIS — N89.8 VAGINAL DISCHARGE: Primary | ICD-10-CM

## 2023-10-09 LAB
KOH PREP NAIL: ABNORMAL
WET PREP GENITAL: NORMAL

## 2023-10-09 PROCEDURE — 87220 TISSUE EXAM FOR FUNGI: CPT | Performed by: NURSE PRACTITIONER

## 2023-10-09 PROCEDURE — 99213 OFFICE O/P EST LOW 20 MIN: CPT | Performed by: NURSE PRACTITIONER

## 2023-10-09 PROCEDURE — 87210 SMEAR WET MOUNT SALINE/INK: CPT | Performed by: NURSE PRACTITIONER

## 2023-10-09 RX ORDER — FLUCONAZOLE 150 MG/1
150 TABLET ORAL AS NEEDED
Qty: 3 TABLET | Refills: 0 | Status: SHIPPED | OUTPATIENT
Start: 2023-10-09

## 2023-10-09 NOTE — PROGRESS NOTES
Chief Complaint   Patient presents with    Vaginitis         Subjective   HPI  Demetria Juarez is a 42 y.o. female, , who presents for evaluation of odor. The discharge is white.  Her symptoms have been present for 1 month(s).  Additional she has noticed  the odor is worse with intercourse .    Prior to the onset of symptoms she was on antibiotics.  She has not recently changed soaps/detergents/toilet tissue.  Prior to this visit, she has used Flagyl that was prescribed by an online doctor with her insurance in an attempt to improve her symptoms. She states she took the medicine for 2 days and stopped taking it due to wanting to have a face to face visit to make sure she does have BV.    Sexual History    She is currently sexually active. In the past year there has been NO new sexual partners. Condoms are never used.  She would not like to be screened for STD's at today's exam.    Current birth control method:  none  .    Menstrual History:    Patient's last menstrual period was 03/15/2021.    In the past 6 months her cycles have been absent.   Her menstrual flow has been absent. Intermenstrual bleeding is absent.  Post-coital bleeding is absent.          OB History          3    Para   3    Term   3            AB        Living   3         SAB        IAB        Ectopic        Molar        Multiple   0    Live Births   3                The additional following portions of the patient's history were reviewed and updated as appropriate: allergies, current medications, past family history, past medical history, past social history, past surgical history, and problem list.    Review of Systems   Genitourinary:  Positive for vaginal discharge.        Vaginal odor   All other systems reviewed and are negative.    All other systems reviewed and are negative.     I have reviewed and agree with the HPI, ROS, and historical information as entered above. Yanet Lozano,  "APRN      Objective   /68   Ht 167.6 cm (65.98\")   Wt 76.7 kg (169 lb)   LMP 03/15/2021   Breastfeeding No   BMI 27.29 kg/mý     Physical Exam  Vitals and nursing note reviewed. Exam conducted with a chaperone present.   Constitutional:       Appearance: Normal appearance.   Pulmonary:      Effort: Pulmonary effort is normal.   Abdominal:      Palpations: Abdomen is soft.   Genitourinary:     Labia:         Right: No rash, tenderness or lesion.         Left: No rash, tenderness or lesion.       Vagina: Normal. No lesions.      Uterus: Absent.       Adnexa:         Right: No mass or tenderness.          Left: No mass or tenderness.        Comments: Chaperone Present  Neurological:      Mental Status: She is alert.         Wet mount performed? Yes. Pertinent positives include: Yeast Buds    Assessment & Plan     Assessment and Plan    Problem List Items Addressed This Visit    None  Visit Diagnoses       Vaginal discharge    -  Primary    Relevant Orders    Candida panel, PCR - Swab, Vagina    Bacterial Vaginosis, VINNY - Swab, Cervix    POC Wet Prep (Completed)    POC KOH Prep (Completed)    Vaginal odor        Relevant Orders    Candida panel, PCR - Swab, Vagina    Bacterial Vaginosis, VINNY - Swab, Cervix    POC Wet Prep (Completed)    POC KOH Prep (Completed)          Few yeast buds seen. Allieab sent. She reports cuts near her introitus at times, none today. Denies hx HSV. Could be due to yeast. Will given external cream, can use coconut oil prn as well. Call with active problem if concerned.        NuSwab ordered  Medication(s) ordered  Counseling on Vaginitis provided  Diflucan x 3, nystatin/triamcinolone cream         Yanet Lozano, APRN  10/09/2023   "

## 2024-04-08 ENCOUNTER — OFFICE VISIT (OUTPATIENT)
Dept: OBSTETRICS AND GYNECOLOGY | Facility: CLINIC | Age: 44
End: 2024-04-08
Payer: COMMERCIAL

## 2024-04-08 VITALS
SYSTOLIC BLOOD PRESSURE: 112 MMHG | WEIGHT: 175.6 LBS | DIASTOLIC BLOOD PRESSURE: 80 MMHG | BODY MASS INDEX: 40.64 KG/M2 | HEIGHT: 55 IN

## 2024-04-08 DIAGNOSIS — Z12.31 ENCOUNTER FOR SCREENING MAMMOGRAM FOR MALIGNANT NEOPLASM OF BREAST: ICD-10-CM

## 2024-04-08 DIAGNOSIS — N90.89 VULVAR IRRITATION: Primary | ICD-10-CM

## 2024-04-08 DIAGNOSIS — N89.8 VAGINAL DISCHARGE: ICD-10-CM

## 2024-04-08 DIAGNOSIS — B37.9 YEAST INFECTION: ICD-10-CM

## 2024-04-08 PROCEDURE — 99213 OFFICE O/P EST LOW 20 MIN: CPT

## 2024-04-08 RX ORDER — FLUCONAZOLE 150 MG/1
TABLET ORAL
Qty: 2 TABLET | Refills: 2 | Status: SHIPPED | OUTPATIENT
Start: 2024-04-08

## 2024-04-08 NOTE — PROGRESS NOTES
Chief Complaint   Patient presents with    Follow-up     Vaginitis          Subjective   HPI  Demertia Juarez is a 43 y.o. female, , who presents for evaluation of vulvar itching. The discharge is watery and white.  Her symptoms have been present for 10 day(s).  Additional she has noticed  none . Tried AZO for approx 3 days. Symptoms are better but not resolved.     Prior to the onset of symptoms she was not on antibiotics. She has not recently changed soaps/detergents/toilet tissue.  Prior to this visit, she has used azo in an attempt to improve her symptoms.     Sexual History    She is currently sexually active. In the past year there has been NO new sexual partners. Condoms are never used.  She would not like to be screened for STD's at today's exam.    Current birth control method:  hysterectomy and partner vasectomy . Hysterectomy due to swollen, fluid filled fallopian tube.     Menstrual History:    Patient's last menstrual period was 03/15/2021.      Additional OB/GYN History   Last Pap :  at [facility].  Last Completed Pap Smear       This patient has no relevant Health Maintenance data.            OB History          4    Para   3    Term   3            AB   1    Living   3         SAB   1    IAB        Ectopic        Molar        Multiple   0    Live Births   3                The additional following portions of the patient's history were reviewed and updated as appropriate: allergies, current medications, past family history, past medical history, past social history, past surgical history, and problem list.    Review of Systems   Respiratory: Negative.  Negative for shortness of breath.    Cardiovascular: Negative.  Negative for chest pain.   Gastrointestinal: Negative.  Negative for abdominal distention, abdominal pain and constipation.   Genitourinary:  Positive for dyspareunia (Lubricants used for relief), vaginal discharge and vaginal pain (vulvar itch and  "watery white discharge). Negative for dysuria, pelvic pain, pelvic pressure, urinary incontinence and vaginal bleeding.   All other systems reviewed and are negative.    All other systems reviewed and are negative.     I have reviewed and agree with the HPI, ROS, and historical information as entered above. Edwina Jenkins, APRN      Objective   /80   Ht 65 cm (25.59\")   Wt 79.7 kg (175 lb 9.6 oz)   LMP 03/15/2021   .52 kg/m²     Physical Exam  Vitals and nursing note reviewed. Exam conducted with a chaperone present.   Constitutional:       General: She is not in acute distress.     Appearance: Normal appearance. She is not ill-appearing or toxic-appearing.   HENT:      Head: Normocephalic and atraumatic.   Pulmonary:      Effort: Pulmonary effort is normal.   Abdominal:      Palpations: Abdomen is soft. There is no mass.      Tenderness: There is no abdominal tenderness.      Hernia: No hernia is present.   Genitourinary:     General: Normal vulva.      Labia:         Right: No rash, tenderness, lesion or injury.         Left: No rash, tenderness, lesion or injury.       Vagina: No signs of injury and foreign body. Vaginal discharge present. No erythema, tenderness, bleeding or lesions.      Cervix: Discharge present. No cervical motion tenderness, friability, lesion, erythema or cervical bleeding.      Uterus: Absent.       Adnexa: Right adnexa normal and left adnexa normal.        Right: No mass or tenderness.          Left: No mass or tenderness.        Comments: Small blessing of thick white d/c noted.     Neurological:      Mental Status: She is alert and oriented to person, place, and time.   Psychiatric:         Mood and Affect: Mood normal.         Behavior: Behavior normal.         Assessment & Plan     Assessment and Plan    Problem List Items Addressed This Visit    None  Visit Diagnoses       Vulvar irritation    -  Primary    Relevant Medications    fluconazole (DIFLUCAN) 150 MG tablet    " Other Relevant Orders    NuSwab Vaginitis (VG) - Swab, Vagina    Yeast infection        Recurrent    Relevant Medications    fluconazole (DIFLUCAN) 150 MG tablet    Encounter for screening mammogram for malignant neoplasm of breast        Relevant Orders    Mammo Screening Digital Tomosynthesis Bilateral With CAD    Vaginal discharge        Relevant Orders    NuSwab Vaginitis (VG) - Swab, Vagina          We discussed potential causes of vaginitis symptoms. She will take Diflucan. Refills sent due to history of recurrent yeast infections.   NuSwab ordered- BV and yeast  Mammogram ordered  Counseling on Vaginitis and precautions provided.  Abstain from coitus during course of treatment.   She will begin vaginal moisturizers 2-3 times per week and vaginal lubricants during intercourse.  Return for Annual physical or sooner if needed.        Edwina Jenkins, APRN  04/08/2024

## 2024-04-10 DIAGNOSIS — B96.89 BV (BACTERIAL VAGINOSIS): Primary | ICD-10-CM

## 2024-04-10 DIAGNOSIS — N76.0 BV (BACTERIAL VAGINOSIS): Primary | ICD-10-CM

## 2024-04-10 LAB
A VAGINAE DNA VAG QL NAA+PROBE: ABNORMAL SCORE
BVAB2 DNA VAG QL NAA+PROBE: ABNORMAL SCORE
C ALBICANS DNA VAG QL NAA+PROBE: POSITIVE
C GLABRATA DNA VAG QL NAA+PROBE: NEGATIVE
MEGA1 DNA VAG QL NAA+PROBE: ABNORMAL SCORE
T VAGINALIS DNA VAG QL NAA+PROBE: NEGATIVE

## 2024-04-10 RX ORDER — METRONIDAZOLE 500 MG/1
500 TABLET ORAL 2 TIMES DAILY
Qty: 14 TABLET | Refills: 0 | Status: SHIPPED | OUTPATIENT
Start: 2024-04-10 | End: 2024-04-17

## 2024-05-01 ENCOUNTER — OFFICE VISIT (OUTPATIENT)
Dept: OBSTETRICS AND GYNECOLOGY | Facility: CLINIC | Age: 44
End: 2024-05-01
Payer: COMMERCIAL

## 2024-05-01 VITALS — BODY MASS INDEX: 187.87 KG/M2 | DIASTOLIC BLOOD PRESSURE: 60 MMHG | WEIGHT: 175 LBS | SYSTOLIC BLOOD PRESSURE: 100 MMHG

## 2024-05-01 DIAGNOSIS — N76.0 BV (BACTERIAL VAGINOSIS): Primary | ICD-10-CM

## 2024-05-01 DIAGNOSIS — B37.9 YEAST INFECTION: ICD-10-CM

## 2024-05-01 DIAGNOSIS — B96.89 BV (BACTERIAL VAGINOSIS): Primary | ICD-10-CM

## 2024-05-01 RX ORDER — CLINDAMYCIN HYDROCHLORIDE 300 MG/1
300 CAPSULE ORAL 3 TIMES DAILY
Qty: 21 CAPSULE | Refills: 0 | Status: SHIPPED | OUTPATIENT
Start: 2024-05-01 | End: 2024-05-08

## 2024-05-01 RX ORDER — FLUCONAZOLE 150 MG/1
TABLET ORAL
Qty: 3 TABLET | Refills: 0 | Status: SHIPPED | OUTPATIENT
Start: 2024-05-01

## 2024-05-01 RX ORDER — OMEPRAZOLE 40 MG/1
CAPSULE, DELAYED RELEASE ORAL
COMMUNITY
Start: 2024-04-30

## 2024-05-01 NOTE — PROGRESS NOTES
Chief Complaint   Patient presents with    Follow-up       Subjective   HPI  Demetria Juarez is a 43 y.o. female, . Patient's last menstrual period was 03/15/2021. who presents for follow up on recurrent yeast and BV.     At her last visit she was treated with Diflucan and antibiotics. Since then she reports her symptoms/issue has worsened. The patient reports additional symptoms as  dyspareunia .        Additional OB/GYN History     Last Pap :   Last Completed Pap Smear       This patient has no relevant Health Maintenance data.            Last mammogram:   Last Completed Mammogram            Ordered - MAMMOGRAM (Every 2 Years) Ordered on 2022  Mammo Screening Digital Tomosynthesis Bilateral With CAD    08/10/2020  Mammo Screening Digital Tomosynthesis Bilateral With CAD    07/15/2019  Mammo Diagnostic Digital Tomosynthesis Bilateral With CAD    2014  MAMMOGRAPHY DIAGNOSTIC BILATERAL                    Tobacco Usage?: No   OB History          4    Para   3    Term   3            AB   1    Living   3         SAB   1    IAB        Ectopic        Molar        Multiple   0    Live Births   3                  Current Outpatient Medications:     omeprazole (priLOSEC) 40 MG capsule, , Disp: , Rfl:     clindamycin (Cleocin) 300 MG capsule, Take 1 capsule by mouth 3 (Three) Times a Day for 7 days., Disp: 21 capsule, Rfl: 0    fluconazole (Diflucan) 150 MG tablet, Take one today and repeat every 3 days x 3 doses, Disp: 3 tablet, Rfl: 0    Stelara 90 MG/ML solution prefilled syringe Injection, , Disp: , Rfl:      Past Medical History:   Diagnosis Date    Anemia     Crohn disease     Fibroid uterus 2013    1X2 CM    History of mammogram 07/15/2019    BILATERAL DX;BENIGN WITH US RIGHT    Hydrosalpinx     IBS (irritable bowel syndrome)     Leiomyoma of uterus 2013    1X2 CM    Neurocardiogenic syncope     Papanicolaou smear 2018    NEG    PONV  (postoperative nausea and vomiting)     Rh incompatibility     rhogam shot-date unknown    Ulcerative colitis         Past Surgical History:   Procedure Laterality Date     SECTION  ,,     SECTION N/A 2017    Procedure:  SECTION REPEAT  PATIENT HAS HX OF NUEROCARDIOGENIC SYNCOPE ;  Surgeon: Dina Campbell MD;  Location: [facility] LABOR DELIVERY;  Service:     CHOLECYSTECTOMY  2013    LAP;HAD CARDIAC ARREST DURING SURGERY    COLONOSCOPY  12/10/2019    CROHNS RPT EVERY 6 MONTHS;10/19/17 COLITIS    LAPAROSCOPIC TOTAL HYSTERECTOMY         The additional following portions of the patient's history were reviewed and updated as appropriate: allergies, current medications, past family history, past medical history, past social history, past surgical history, and problem list.    Review of Systems   Constitutional: Negative.    HENT: Negative.     Eyes: Negative.    Respiratory: Negative.     Cardiovascular: Negative.    Gastrointestinal: Negative.    Endocrine: Negative.    Genitourinary:  Positive for vaginal discharge.   Musculoskeletal: Negative.    Skin: Negative.    Allergic/Immunologic: Negative.    Neurological: Negative.    Hematological: Negative.    Psychiatric/Behavioral: Negative.         I have reviewed and agree with the HPI, ROS, and historical information as entered above. JULI Richmond      Objective   /60   Wt 79.4 kg (175 lb)   LMP 03/15/2021   .87 kg/m²     Physical Exam  Vitals and nursing note reviewed. Exam conducted with a chaperone present.   Constitutional:       Appearance: Normal appearance.   Genitourinary:     General: Normal vulva.      Exam position: Lithotomy position.      Labia:         Right: No rash, tenderness or lesion.         Left: No rash, tenderness or lesion.       Vagina: Vaginal discharge and tenderness present. No bleeding or lesions.      Cervix: Normal. No cervical motion tenderness, discharge, lesion or cervical  bleeding.      Uterus: Normal. Not enlarged, not fixed and not tender.       Adnexa: Right adnexa normal and left adnexa normal.        Right: No mass or tenderness.          Left: No mass or tenderness.        Rectum: Normal. No external hemorrhoid.   Musculoskeletal:         General: Normal range of motion.   Neurological:      Mental Status: She is alert and oriented to person, place, and time.         Assessment & Plan     Assessment     Problem List Items Addressed This Visit    None  Visit Diagnoses       BV (bacterial vaginosis)    -  Primary    Relevant Medications    clindamycin (Cleocin) 300 MG capsule    Other Relevant Orders    NuSwab VG+, Candida 6sp    Genital Mycoplasmas VINNY, Swab - Swab, Vagina    Yeast infection        Relevant Medications    fluconazole (Diflucan) 150 MG tablet    Other Relevant Orders    NuSwab VG+, Candida 6sp    Genital Mycoplasmas VINNY, Swab - Swab, Vagina              Plan     Cultures obtained, will test for mycoplasmas as well as BV/yeast.   Will try diflucan x3 and cleocin, since it is more effective against atopobium BV. If diflucan does not resolve yeast, may try terconazole.   Return if symptoms worsen or fail to improve.        Chasidy Glover, JULI  05/01/2024

## 2024-05-02 LAB — SPECIMEN STATUS: NORMAL

## 2024-05-07 LAB
M GENITALIUM DNA SPEC QL NAA+PROBE: NEGATIVE
M HOMINIS DNA SPEC QL NAA+PROBE: NEGATIVE
UREAPLASMA DNA SPEC QL NAA+PROBE: NEGATIVE

## 2024-06-19 ENCOUNTER — OFFICE VISIT (OUTPATIENT)
Dept: OBSTETRICS AND GYNECOLOGY | Facility: CLINIC | Age: 44
End: 2024-06-19
Payer: COMMERCIAL

## 2024-06-19 VITALS
WEIGHT: 179.2 LBS | HEIGHT: 67 IN | DIASTOLIC BLOOD PRESSURE: 62 MMHG | BODY MASS INDEX: 28.12 KG/M2 | SYSTOLIC BLOOD PRESSURE: 108 MMHG

## 2024-06-19 DIAGNOSIS — N76.0 ACUTE VAGINITIS: Primary | ICD-10-CM

## 2024-06-19 NOTE — PROGRESS NOTES
Chief Complaint   Patient presents with    Vaginitis         Subjective   HPI  Demetria Juarez is a 43 y.o. female, , Pt was last seen on 5/3/2024 for recurrent BV and yeast. Pt states she get frustrated due to recurring issue since she has been here multiple times.       She presents for evaluation of discharge. The discharge is white.  Her symptoms have been present for 2 week(s).  Additional she has noticed  none .    Prior to the onset of symptoms she was not on antibiotics.  She has not recently changed soaps/detergents/toilet tissue.  Prior to this visit, she has used Borax for about 1 week in an attempt to improve her symptoms.     Sexual History    She is currently sexually active. In the past year there has been NO new sexual partners. Condoms  not needed, hysterectomy .  She would not like to be screened for STD's at today's exam.    Current birth control method:  hysterectomy .    Menstrual History:    Patient's last menstrual period was 03/15/2021.  She is s/p TLH/BS 2021 with [facility] Veras for AUB       Additional OB/GYN History   Last Pap :   Last Completed Pap Smear       This patient has no relevant Health Maintenance data.            OB History          4    Para   3    Term   3            AB   1    Living   3         SAB   1    IAB        Ectopic        Molar        Multiple   0    Live Births   3                The additional following portions of the patient's history were reviewed and updated as appropriate: allergies, current medications, past family history, past medical history, past social history, past surgical history, and problem list.    Review of Systems   Respiratory: Negative.  Negative for shortness of breath.    Cardiovascular: Negative.  Negative for chest pain.   Gastrointestinal: Negative.  Negative for abdominal distention, abdominal pain and constipation.   Genitourinary:  Positive for vaginal discharge and vaginal pain (Vaginal  "irritation). Negative for dysuria, pelvic pain, pelvic pressure, urinary incontinence and vaginal bleeding.     All other systems reviewed and are negative.     I have reviewed and agree with the HPI, ROS, and historical information as entered above. Edwina Jenkins, APRN      Objective   /62   Ht 170.2 cm (67\")   Wt 81.3 kg (179 lb 3.2 oz)   LMP 03/15/2021   BMI 28.07 kg/m²     Physical Exam  Vitals and nursing note reviewed. Exam conducted with a chaperone present.   Constitutional:       General: She is not in acute distress.     Appearance: Normal appearance. She is not ill-appearing or toxic-appearing.   HENT:      Head: Normocephalic and atraumatic.   Pulmonary:      Effort: Pulmonary effort is normal.   Abdominal:      Palpations: Abdomen is soft. There is no mass.      Tenderness: There is no abdominal tenderness.      Hernia: No hernia is present.   Genitourinary:     Labia:         Right: Tenderness present. No rash, lesion or injury.         Left: Tenderness present. No rash, lesion or injury.       Vagina: No signs of injury. Vaginal discharge present. No erythema, tenderness, bleeding or lesions.      Cervix: Discharge present. No cervical motion tenderness, friability, lesion, erythema or cervical bleeding.      Uterus: Absent.       Adnexa: Right adnexa normal and left adnexa normal.        Right: No mass or tenderness.          Left: No mass or tenderness.            Comments: Scant amt of white thin d/c noted.   Neurological:      Mental Status: She is alert and oriented to person, place, and time.   Psychiatric:         Mood and Affect: Mood normal.         Behavior: Behavior normal.         Assessment & Plan     Assessment and Plan    Problem List Items Addressed This Visit    None  Visit Diagnoses       Acute vaginitis    -  Primary    Relevant Medications    terconazole (TERAZOL 7) 0.4 % vaginal cream    Other Relevant Orders    NuSwab Vaginitis (VG) - Swab, Vagina              NuSwab " ordered- BV, yeast  Medication(s) ordered-Terazol cream  Counseling on Vaginitis provided  Begin Rephresh Pro B daily.   Advised lubricants during IC  Vaginal Moisturizers 2-3x/week if symptoms do not improve with lubricants and Terazol.  Abstain from coitus during course of treatment  Atrophic vaginitis education included in patient instructions.   Return for Annual physical or sooner if needed.          Edwina Jenkins, APRN  06/19/2024

## 2024-06-21 DIAGNOSIS — B96.89 BV (BACTERIAL VAGINOSIS): Primary | ICD-10-CM

## 2024-06-21 DIAGNOSIS — N76.0 BV (BACTERIAL VAGINOSIS): Primary | ICD-10-CM

## 2024-06-21 LAB
A VAGINAE DNA VAG QL NAA+PROBE: ABNORMAL SCORE
BVAB2 DNA VAG QL NAA+PROBE: ABNORMAL SCORE
C ALBICANS DNA VAG QL NAA+PROBE: NEGATIVE
C GLABRATA DNA VAG QL NAA+PROBE: NEGATIVE
MEGA1 DNA VAG QL NAA+PROBE: ABNORMAL SCORE
T VAGINALIS DNA VAG QL NAA+PROBE: NEGATIVE

## 2024-06-21 RX ORDER — CLINDAMYCIN PHOSPHATE 20 MG/G
1 CREAM VAGINAL NIGHTLY
Qty: 40 G | Refills: 0 | Status: SHIPPED | OUTPATIENT
Start: 2024-06-21 | End: 2024-06-28

## 2024-07-08 ENCOUNTER — TELEPHONE (OUTPATIENT)
Dept: OBSTETRICS AND GYNECOLOGY | Facility: CLINIC | Age: 44
End: 2024-07-08
Payer: COMMERCIAL

## 2024-07-08 NOTE — TELEPHONE ENCOUNTER
Spoke with patient and offered appointment Thursday and she has active sx. States she will be out of town. Informed next option would be Monday. She will  schedule then. Will call if I see any cancellations.

## 2024-07-08 NOTE — TELEPHONE ENCOUNTER
Caller: Demetria Juarez    Relationship to patient: Self    Best call back number: [phone]    Chief complaint: RECURRENT BV    Type of visit: GYN FOLLOW UP    Requested date: ASAP     Additional notes:PATIENT HAS BEEN DEALING WITH THE BV FOR OVER 3 MONTHS AND IT KEEPS COMING BACK OR IS NEVER FULLY GOING AWAY//HAS SEEN SEVERAL APRNS AND THIS TIME WOULD LIKE TO SEE DR SAL WITH HOPES OF GETTING SO RELIEF//DR VU FIRST AVAILABLE IS 7/23 BUT PATIENT IS HOPING TO GET SEEN BEFORE THAT PLEASE FOLLOW UP

## 2024-07-15 ENCOUNTER — OFFICE VISIT (OUTPATIENT)
Dept: OBSTETRICS AND GYNECOLOGY | Facility: CLINIC | Age: 44
End: 2024-07-15
Payer: COMMERCIAL

## 2024-07-15 VITALS
BODY MASS INDEX: 28.09 KG/M2 | WEIGHT: 179 LBS | HEIGHT: 67 IN | DIASTOLIC BLOOD PRESSURE: 66 MMHG | SYSTOLIC BLOOD PRESSURE: 110 MMHG

## 2024-07-15 DIAGNOSIS — N89.8 VAGINAL DISCHARGE: Primary | ICD-10-CM

## 2024-07-15 PROCEDURE — 99213 OFFICE O/P EST LOW 20 MIN: CPT | Performed by: OBSTETRICS & GYNECOLOGY

## 2024-07-15 NOTE — PROGRESS NOTES
Chief Complaint   Patient presents with    Vaginitis         Subjective   HPI  Demetria Juarez is a 43 y.o. female, , who presents for evaluation of recurrent BV. Pt was here on , 5/3,  and 10/2023 for BV/yeast. Two times out of those four, her swabs came back positive: (BV+) and  (yeast+). Pt states she feels her symptoms are resolved for a couple days and then are back after medication.     Currently she is having discharge and vaginal irritation and odor. The discharge is clear and white.  Her symptoms have been present for 3 month(s).  Additional she has noticed  none .    Prior to the onset of symptoms she was not on antibiotics.  She has not recently changed soaps/detergents/toilet tissue.  Prior to this visit, she has used none in an attempt to improve her symptoms.     Sexual History    She is currently sexually active. In the past year there has been NO new sexual partners. Condoms  are not needed: hysterectomy .  She would not like to be screened for STD's at today's exam.    Current birth control method:  hysterectomy .    Menstrual History:    Patient's last menstrual period was 03/15/2021.  She is s/p TLH/BS 2021 with [facility] for [facility]         Additional OB/GYN History   Last Pap :   Last Completed Pap Smear       This patient has no relevant Health Maintenance data.            OB History          4    Para   3    Term   3            AB   1    Living   3         SAB   1    IAB        Ectopic        Molar        Multiple   0    Live Births   3                The additional following portions of the patient's history were reviewed and updated as appropriate: allergies, current medications, past family history, past medical history, past social history, past surgical history, and problem list.    Review of Systems   Constitutional: Negative.    HENT: Negative.     Eyes: Negative.    Respiratory: Negative.     Cardiovascular: Negative.   "  Gastrointestinal: Negative.    Endocrine: Negative.    Genitourinary:  Positive for vaginal discharge.        Vaginal odor   Musculoskeletal: Negative.    Skin: Negative.    Allergic/Immunologic: Negative.    Neurological: Negative.    Hematological: Negative.    Psychiatric/Behavioral: Negative.       All other systems reviewed and are negative.     I have reviewed and agree with the HPI, ROS, and historical information as entered above. Dina Campbell MD      Objective   /66   Ht 170.2 cm (67\")   Wt 81.2 kg (179 lb)   LMP 03/15/2021   BMI 28.04 kg/m²     Physical Exam  Vitals and nursing note reviewed. Exam conducted with a chaperone present.   Genitourinary:     General: Normal vulva.      Exam position: Lithotomy position.      Labia:         Right: No rash, tenderness or lesion.         Left: No rash, tenderness or lesion.       Urethra: No urethral pain, urethral swelling or urethral lesion.      Vagina: Normal. No tenderness or lesions.      Uterus: Absent.       Adnexa:         Right: No mass, tenderness or fullness.          Left: No mass, tenderness or fullness.        Rectum: No external hemorrhoid.      Comments: Chaperone Present.  White pasty discharge noted.        Wet mount performed? No.    Assessment & Plan     Assessment and Plan    Problem List Items Addressed This Visit    None  Visit Diagnoses       Vaginal discharge    -  Primary              NuSwab ordered  Discussed with patient we only have diagnosis of bacterial vaginosis 1 time and yeast 1 time.  I recommend increasing water intake and decreasing soft drinks.  Make sure she is taking wet clothing off immediately after working out.  We will check a new swab today only treat if she has clinically proven infection.  Reassurance today.  White pasty vaginal discharge noted.  No erythema.        Dina Campbell MD  07/15/2024   "

## 2024-07-18 LAB
A VAGINAE DNA VAG QL NAA+PROBE: ABNORMAL SCORE
BVAB2 DNA VAG QL NAA+PROBE: ABNORMAL SCORE
C ALBICANS DNA VAG QL NAA+PROBE: NEGATIVE
C GLABRATA DNA VAG QL NAA+PROBE: NEGATIVE
C KRUSEI DNA VAG QL NAA+PROBE: NEGATIVE
C LUSITANIAE DNA VAG QL NAA+PROBE: NEGATIVE
CANDIDA DNA VAG QL NAA+PROBE: POSITIVE
MEGA1 DNA VAG QL NAA+PROBE: ABNORMAL SCORE
T VAGINALIS DNA VAG QL NAA+PROBE: NEGATIVE

## 2024-07-21 DIAGNOSIS — B37.9 YEAST INFECTION: Primary | ICD-10-CM

## 2024-07-21 RX ORDER — FLUCONAZOLE 100 MG/1
100 TABLET ORAL WEEKLY
Qty: 30 TABLET | Refills: 0 | Status: SHIPPED | OUTPATIENT
Start: 2024-07-21 | End: 2025-02-10

## 2024-07-21 NOTE — PROGRESS NOTES
Call patient and let her know that she was positive for a yeast variant.  Because she is having persistent symptoms, I am going to recommend that she be treated with Terazol cream for 7 days.  I will then ask her to go on a weekly Diflucan x 6 months.  This should eradicate the yeast.  Orders have been placed.  Have the patient call with any concerns.

## 2024-08-12 ENCOUNTER — OFFICE VISIT (OUTPATIENT)
Dept: OBSTETRICS AND GYNECOLOGY | Facility: CLINIC | Age: 44
End: 2024-08-12
Payer: COMMERCIAL

## 2024-08-12 VITALS
BODY MASS INDEX: 29.12 KG/M2 | DIASTOLIC BLOOD PRESSURE: 70 MMHG | WEIGHT: 181.2 LBS | SYSTOLIC BLOOD PRESSURE: 104 MMHG | HEIGHT: 66 IN

## 2024-08-12 DIAGNOSIS — N76.1 CHRONIC VAGINITIS: ICD-10-CM

## 2024-08-12 DIAGNOSIS — N89.8 VAGINAL DISCHARGE: Primary | ICD-10-CM

## 2024-08-12 LAB
KETONES UR QL: ABNORMAL
UROBILINOGEN UR QL: ABNORMAL

## 2024-08-12 PROCEDURE — 99213 OFFICE O/P EST LOW 20 MIN: CPT

## 2024-08-12 PROCEDURE — 81002 URINALYSIS NONAUTO W/O SCOPE: CPT

## 2024-08-12 NOTE — PROGRESS NOTES
Chief Complaint   Patient presents with    Vaginal Discharge    Vaginitis       Subjective   HPI  Demetria Juarez is a 43 y.o. female, .Patient's last menstrual period was 03/15/2021. who presents for follow up on vaginitis, vaginal itching, vaginal discharge with vaginal odor.      At her last visit she was treated with Diflucan and Terazol 7. Since then she reports her symptoms/issue has improved slightly. The patient reports additional symptoms as  occasional right low ab pain .        Additional OB/GYN History     Last Pap :   Last Completed Pap Smear       This patient has no relevant Health Maintenance data.            Last mammogram:   Last Completed Mammogram       This patient has no relevant Health Maintenance data.            Tobacco Usage?: No   OB History          4    Para   3    Term   3            AB   1    Living   3         SAB   1    IAB        Ectopic        Molar        Multiple   0    Live Births   3                  Current Outpatient Medications:     fluconazole (Diflucan) 100 MG tablet, Take 1 tablet by mouth 1 (One) Time Per Week for 30 doses., Disp: 30 tablet, Rfl: 0    omeprazole (priLOSEC) 40 MG capsule, , Disp: , Rfl:     Stelara 90 MG/ML solution prefilled syringe Injection, , Disp: , Rfl:      Past Medical History:   Diagnosis Date    Anemia     Crohn disease     Fibroid uterus 2013    1X2 CM    History of mammogram 07/15/2019    BILATERAL DX;BENIGN WITH US RIGHT    Hydrosalpinx     IBS (irritable bowel syndrome)     Leiomyoma of uterus 2013    1X2 CM    Neurocardiogenic syncope     Papanicolaou smear 2018    NEG    PONV (postoperative nausea and vomiting)     Rh incompatibility     rhogam shot-date unknown    Ulcerative colitis         Past Surgical History:   Procedure Laterality Date     SECTION  ,,     SECTION N/A 2017    Procedure:  SECTION REPEAT  PATIENT HAS HX OF NUEROCARDIOGENIC  "SYNCOPE ;  Surgeon: Dina Campbell MD;  Location: [facility] LABOR DELIVERY;  Service:     CHOLECYSTECTOMY  09/2013    LAP;HAD CARDIAC ARREST DURING SURGERY    COLONOSCOPY  12/10/2019    CROHNS RPT EVERY 6 MONTHS;10/19/17 COLITIS    LAPAROSCOPIC TOTAL HYSTERECTOMY         The additional following portions of the patient's history were reviewed and updated as appropriate: allergies, current medications, past family history, past medical history, past social history, past surgical history, and problem list.    Review of Systems   Genitourinary:  Positive for vaginal discharge.   All other systems reviewed and are negative.      I have reviewed and agree with the HPI, ROS, and historical information as entered above. Temi Shaw, APRN      Objective   /70   Ht 167.6 cm (66\")   Wt 82.2 kg (181 lb 3.2 oz)   LMP 03/15/2021   BMI 29.25 kg/m²     Physical Exam  Vitals and nursing note reviewed. Exam conducted with a chaperone present.   Constitutional:       General: She is not in acute distress.     Appearance: Normal appearance. She is well-developed.   HENT:      Head: Normocephalic and atraumatic.   Pulmonary:      Effort: Pulmonary effort is normal. No retractions.   Abdominal:      General: There is no distension.      Palpations: Abdomen is soft. Abdomen is not rigid. There is no mass.      Tenderness: There is no abdominal tenderness. There is no guarding or rebound.      Hernia: No hernia is present.   Genitourinary:     General: Normal vulva.      Exam position: Lithotomy position.      Pubic Area: No rash.       Labia:         Right: Tenderness present. No lesion.         Left: Tenderness present. No lesion.       Urethra: No urethral swelling or urethral lesion.      Vagina: Normal. No vaginal discharge or lesions.      Cervix: No cervical motion tenderness, discharge or lesion.      Uterus: Normal.       Adnexa:         Right: No mass or tenderness.          Left: No mass or tenderness.        Rectum: " Normal. No external hemorrhoid.      Comments: Erythema patient thinks from wiping will check urine  Lymphadenopathy:      Lower Body: No right inguinal adenopathy. No left inguinal adenopathy.   Skin:     General: Skin is warm and dry.   Neurological:      Mental Status: She is alert and oriented to person, place, and time.   Psychiatric:         Mood and Affect: Mood normal.         Behavior: Behavior normal.         Thought Content: Thought content normal.         Assessment & Plan     Assessment     Problem List Items Addressed This Visit    None  Visit Diagnoses       Vaginal discharge    -  Primary    Relevant Orders    Genital Mycoplasmas VINNY, Swab - Swab, Cervix    NuSwab VG+ - Swab, Cervix    POC Urinalysis Dipstick (Completed)    Urine Culture - Urine, Urine, Clean Catch    Chronic vaginitis        Relevant Orders    Genital Mycoplasmas VINNY, Swab - Swab, Cervix    NuSwab VG+ - Swab, Cervix            Plan     Reoccurring vaginitis: nuswab sent  Dip and culture  Follow up PRN or annually        Temi Shaw, APRN  08/12/2024

## 2024-08-19 LAB
A VAGINAE DNA VAG QL NAA+PROBE: NORMAL SCORE
BVAB2 DNA VAG QL NAA+PROBE: NORMAL SCORE
C ALBICANS DNA VAG QL NAA+PROBE: NEGATIVE
C GLABRATA DNA VAG QL NAA+PROBE: NEGATIVE
C TRACH DNA SPEC QL NAA+PROBE: NEGATIVE
MEGA1 DNA VAG QL NAA+PROBE: NORMAL SCORE
N GONORRHOEA DNA VAG QL NAA+PROBE: NEGATIVE
T VAGINALIS DNA VAG QL NAA+PROBE: NEGATIVE

## 2024-08-23 ENCOUNTER — HOSPITAL ENCOUNTER (OUTPATIENT)
Dept: MAMMOGRAPHY | Facility: HOSPITAL | Age: 44
Discharge: HOME | End: 2024-08-23
Payer: COMMERCIAL

## 2024-08-23 DIAGNOSIS — Z12.31 ENCOUNTER FOR SCREENING MAMMOGRAM FOR MALIGNANT NEOPLASM OF BREAST: ICD-10-CM

## 2024-08-23 LAB
NCCN CRITERIA FLAG: ABNORMAL
TYRER CUZICK SCORE: 17.8

## 2024-08-23 PROCEDURE — 77063 BREAST TOMOSYNTHESIS BI: CPT

## 2024-08-23 PROCEDURE — 77067 SCR MAMMO BI INCL CAD: CPT

## 2024-08-29 ENCOUNTER — TELEPHONE (OUTPATIENT)
Dept: OBSTETRICS AND GYNECOLOGY | Facility: CLINIC | Age: 44
End: 2024-08-29
Payer: COMMERCIAL

## 2024-08-29 NOTE — TELEPHONE ENCOUNTER
Patient of ; LOV 08/12/2024.  Returned patient's call.   Patient is asking about the results of her screening mammogram that was done 08/23/24.  Informed her that the report has not been completed by the reading physician. She can contact the mammography department to inquire when she might expect results. She v/u and agreed.

## 2024-09-03 ENCOUNTER — HOSPITAL ENCOUNTER (OUTPATIENT)
Dept: MAMMOGRAPHY | Facility: HOSPITAL | Age: 44
Discharge: HOME | End: 2024-09-03
Admitting: RADIOLOGY
Payer: COMMERCIAL

## 2024-09-03 DIAGNOSIS — R92.8 ABNORMAL MAMMOGRAM: ICD-10-CM

## 2024-09-03 PROCEDURE — 77061 BREAST TOMOSYNTHESIS UNI: CPT | Performed by: RADIOLOGY

## 2024-09-03 PROCEDURE — 77065 DX MAMMO INCL CAD UNI: CPT | Performed by: RADIOLOGY

## 2024-09-03 PROCEDURE — 77065 DX MAMMO INCL CAD UNI: CPT

## 2024-09-03 PROCEDURE — G0279 TOMOSYNTHESIS, MAMMO: HCPCS

## 2024-09-09 DIAGNOSIS — Z13.79 GENETIC TESTING: Primary | ICD-10-CM

## 2024-09-10 ENCOUNTER — OFFICE VISIT (OUTPATIENT)
Dept: OBSTETRICS AND GYNECOLOGY | Facility: CLINIC | Age: 44
End: 2024-09-10
Payer: COMMERCIAL

## 2024-09-10 VITALS
HEIGHT: 66 IN | SYSTOLIC BLOOD PRESSURE: 110 MMHG | DIASTOLIC BLOOD PRESSURE: 74 MMHG | WEIGHT: 179 LBS | BODY MASS INDEX: 28.77 KG/M2

## 2024-09-10 DIAGNOSIS — R10.31 RIGHT LOWER QUADRANT PAIN: ICD-10-CM

## 2024-09-10 DIAGNOSIS — Z12.31 BREAST CANCER SCREENING BY MAMMOGRAM: ICD-10-CM

## 2024-09-10 DIAGNOSIS — Z01.419 WOMEN'S ANNUAL ROUTINE GYNECOLOGICAL EXAMINATION: Primary | ICD-10-CM

## 2024-09-10 DIAGNOSIS — Z90.710 HISTORY OF HYSTERECTOMY: ICD-10-CM

## 2024-09-10 PROCEDURE — 99396 PREV VISIT EST AGE 40-64: CPT | Performed by: OBSTETRICS & GYNECOLOGY

## 2024-09-10 NOTE — PROGRESS NOTES
Gynecologic Annual Exam Note        GYN Annual Exam     CC - Here for annual exam.        HPI  Demetria Juarez is a 43 y.o. female, , who presents for annual well woman exam as a established patient.  She is s/p TLH in  for fibroids and hydrosalpinx .. Denies vaginal bleeding.   There were no changes to her medical or surgical history since her last visit. Marital Status: .  She is sexually active. She has not had new partners.. STD testing recommendations have been explained to the patient and she declines STD testing.    The patient would like to discuss the following complaints today: pelvic pain that has been there for a couple of months but has worsened in the last couple of weeks. It worsens when sitting it goes away when laying or standing. It is worse in the RLQ and right side of the lower back and radiates down the front of her leg.    She was struggling with chronic yeast and has been on Diflucan weekly since July and is feeling a lot better.    Additional OB/GYN History   On HRT? No    Last Pap : . Results: negative. HPV: unknown .   Last Completed Pap Smear       This patient has no relevant Health Maintenance data.          History of abnormal Pap smear: no  Family history of uterine, colon, breast, or ovarian cancer: yes - paternal aunt X 2 with breast cancer, paternal aunt X 1 with ovarian cancer  Performs monthly Self-Breast Exam: yes  Last mammogram: 9/3/24. Done at . There is a copy in the chart.    Last Completed Mammogram            MAMMOGRAM (Every 2 Years) Next due on 9/3/2026      2024  Mammo Diagnostic Digital Tomosynthesis Right With CAD    2024  Mammo Screening Digital Tomosynthesis Bilateral With CAD    2022  Mammo Screening Digital Tomosynthesis Bilateral With CAD    08/10/2020  Mammo Screening Digital Tomosynthesis Bilateral With CAD    07/15/2019  Mammo Diagnostic Digital Tomosynthesis Bilateral With CAD    Only the first 5 history  entries have been loaded, but more history exists.                  Last colonoscopy: has had a colonoscopy 2 years ago    Last Completed Colonoscopy       This patient has no relevant Health Maintenance data.            She has never had a bone density scan  Exercises Regularly: yes  Feelings of Anxiety or Depression: no      Tobacco Usage?: No       Current Outpatient Medications:     fluconazole (Diflucan) 100 MG tablet, Take 1 tablet by mouth 1 (One) Time Per Week for 30 doses., Disp: 30 tablet, Rfl: 0    omeprazole (priLOSEC) 40 MG capsule, , Disp: , Rfl:     Stelara 90 MG/ML solution prefilled syringe Injection, , Disp: , Rfl:     Patient denies the need for medication refills today.    OB History          4    Para   3    Term   3            AB   1    Living   3         SAB   1    IAB        Ectopic        Molar        Multiple   0    Live Births   3                Past Medical History:   Diagnosis Date    Anemia     Crohn disease     Fibroid uterus 2013    1X2 CM    History of mammogram 07/15/2019    BILATERAL DX;BENIGN WITH US RIGHT    Hydrosalpinx     IBS (irritable bowel syndrome)     Leiomyoma of uterus 2013    1X2 CM    Neurocardiogenic syncope     Papanicolaou smear 2018    NEG    PONV (postoperative nausea and vomiting)     Rh incompatibility     rhogam shot-date unknown    Ulcerative colitis         Past Surgical History:   Procedure Laterality Date     SECTION  ,,     SECTION N/A 2017    Procedure:  SECTION REPEAT  PATIENT HAS HX OF NUEROCARDIOGENIC SYNCOPE ;  Surgeon: Dina Campbell MD;  Location: [facility] LABOR DELIVERY;  Service:     CHOLECYSTECTOMY  2013    LAP;HAD CARDIAC ARREST DURING SURGERY    COLONOSCOPY  12/10/2019    CROHNS RPT EVERY 6 MONTHS;10/19/17 COLITIS    LAPAROSCOPIC TOTAL HYSTERECTOMY         Health Maintenance   Topic Date Due    Annual Gynecologic Pelvic and Breast Exam  Never done    BMI FOLLOWUP  Never  "done    PAP SMEAR  02/10/2023    ANNUAL PHYSICAL  02/15/2024    TDAP/TD VACCINES (2 - Td or Tdap) 03/26/2024    COVID-19 Vaccine (2 - 2023-24 season) 09/01/2024    INFLUENZA VACCINE  08/01/2024    MAMMOGRAM  09/03/2026    HEPATITIS C SCREENING  Completed    Pneumococcal Vaccine 0-64  Aged Out       The additional following portions of the patient's history were reviewed and updated as appropriate: allergies, current medications, past family history, past medical history, past social history, past surgical history, and problem list.    Review of Systems   Constitutional: Negative.    HENT: Negative.     Eyes: Negative.    Respiratory: Negative.     Cardiovascular: Negative.    Gastrointestinal: Negative.    Endocrine: Negative.    Genitourinary:  Positive for pelvic pain.   Musculoskeletal: Negative.    Skin: Negative.    Allergic/Immunologic: Negative.    Neurological: Negative.    Hematological: Negative.    Psychiatric/Behavioral: Negative.         I have reviewed and agree with the HPI, ROS, and historical information as entered above. Dina Campbell MD      Objective   /74   Ht 167.6 cm (66\")   Wt 81.2 kg (179 lb)   LMP 03/15/2021   BMI 28.89 kg/m²     Physical Exam  Vitals and nursing note reviewed. Exam conducted with a chaperone present.   Constitutional:       Appearance: She is well-developed.   HENT:      Head: Normocephalic and atraumatic.   Neck:      Thyroid: No thyroid mass or thyromegaly.   Cardiovascular:      Rate and Rhythm: Normal rate and regular rhythm.      Heart sounds: No murmur heard.  Pulmonary:      Effort: Pulmonary effort is normal. No retractions.      Breath sounds: Normal breath sounds. No wheezing, rhonchi or rales.   Chest:      Chest wall: No mass or tenderness.   Breasts:     Right: Normal. No mass, nipple discharge, skin change or tenderness.      Left: Normal. No mass, nipple discharge, skin change or tenderness.   Abdominal:      General: Bowel sounds are " normal.      Palpations: Abdomen is soft. Abdomen is not rigid. There is no mass.      Tenderness: There is no abdominal tenderness. There is no guarding.      Hernia: No hernia is present. There is no hernia in the left inguinal area or right inguinal area.   Genitourinary:     General: Normal vulva.      Exam position: Lithotomy position.      Pubic Area: No rash.       Labia:         Right: No rash, tenderness or lesion.         Left: No rash, tenderness or lesion.       Urethra: No urethral pain or urethral swelling.      Vagina: Normal. No vaginal discharge or lesions.      Uterus: Absent.       Adnexa:         Right: No mass, tenderness or fullness.          Left: No mass, tenderness or fullness.        Rectum: No external hemorrhoid.      Comments: Cervix surgically absent.  Vaginal cuff intact.  Musculoskeletal:      Cervical back: Normal range of motion. No muscular tenderness.   Neurological:      Mental Status: She is alert and oriented to person, place, and time.   Psychiatric:         Behavior: Behavior normal.             Assessment and Plan    Problem List Items Addressed This Visit          Gastrointestinal Abdominal     Right lower quadrant pain    Overview     9/10/2024-patient describing intermittent right lower quadrant pain.  In discussion it appears to be more related to musculoskeletal system.  Patient is also struggling with history of Crohn's disease however.  She is getting a colonoscopy in 2 weeks.  We will also get an ultrasound to rule out any gynecologic source.         Relevant Orders    US Non-ob Transvaginal       Genitourinary and Reproductive     History of hysterectomy     Other Visit Diagnoses       Women's annual routine gynecological examination    -  Primary    Breast cancer screening by mammogram                GYN annual well woman exam.   Reviewed monthly self breast exams.  Instructed to call with lumps, pain, or breast discharge.  Yearly mammograms ordered.  Ordered  mammogram today.  Recommended use of Vitamin D and getting adequate calcium in her diet. (1500mg)  Reviewed exercise as a preventative health measures.   Colonoscopy recommended.  Recommended Flu Vaccine in Fall of each year.  RTC in 1 year or PRN with problems.  Return in about 2 weeks (around 9/24/2024) for ultrasound.         Dina Campbell MD  09/10/2024

## 2024-09-13 ENCOUNTER — TELEPHONE (OUTPATIENT)
Dept: OBSTETRICS AND GYNECOLOGY | Facility: CLINIC | Age: 44
End: 2024-09-13

## 2024-09-13 NOTE — TELEPHONE ENCOUNTER
Caller: Demetria Juarez    Relationship to patient: Self    Best call back number: [phone] (home)       Patient is needing: PT WOULD LIKE TO SEE IF SHE COULD MOVE HER US AND FU TO ONE DAY NEXT WEEK OTHER THAN MONDAY. PLEASE CALL PATIENT BACK TO LET HER KNOW IF THIS IS POSSIBLE

## 2024-09-26 ENCOUNTER — OFFICE VISIT (OUTPATIENT)
Dept: OBSTETRICS AND GYNECOLOGY | Facility: CLINIC | Age: 44
End: 2024-09-26
Payer: COMMERCIAL

## 2024-09-26 VITALS
BODY MASS INDEX: 28.7 KG/M2 | DIASTOLIC BLOOD PRESSURE: 80 MMHG | SYSTOLIC BLOOD PRESSURE: 108 MMHG | HEIGHT: 66 IN | WEIGHT: 178.6 LBS

## 2024-09-26 DIAGNOSIS — R10.31 RIGHT LOWER QUADRANT PAIN: ICD-10-CM

## 2024-09-26 DIAGNOSIS — Z90.710 HISTORY OF HYSTERECTOMY: Primary | ICD-10-CM

## 2024-11-08 ENCOUNTER — TELEPHONE (OUTPATIENT)
Dept: OBSTETRICS AND GYNECOLOGY | Facility: CLINIC | Age: 44
End: 2024-11-08
Payer: COMMERCIAL

## 2024-11-08 NOTE — TELEPHONE ENCOUNTER
Caller: Demetria Juarez    Relationship to patient: Self    Best call back number: [phone] (home)       Patient is needing: PT CALLING IN TO SEE IF THERE IS ANYTHING AVAILABLE TODAY TO BE SEEN FOR POSSIBLE BV. IS HAVING DISCHARGE WITH ODOR AND ITCHING. PT HAS A HECTIC SCHEDULE NEXT WEEK AND WOULD PREFER TO BE SEEN TODAY.  PT IS OK WITH SEEING ANY NP.

## 2024-11-08 NOTE — TELEPHONE ENCOUNTER
Patient reports she has had persistent symptoms for about a month of vaginal discharge that is abnormal for her and a slight odor. She has had some itching. Denies any dysuria. She reports she is currently on Diflucan weekly x6 months. She denies any new changes to soaps/detergents/toilet tissues. No new sexual partners. Appointment offered with NP on Monday but patient declined. Advised she could see PCP or [facility] today.

## 2024-12-12 ENCOUNTER — TELEPHONE (OUTPATIENT)
Dept: OBSTETRICS AND GYNECOLOGY | Facility: CLINIC | Age: 44
End: 2024-12-12
Payer: COMMERCIAL

## 2024-12-12 NOTE — TELEPHONE ENCOUNTER
Provider: DR SAL    Caller: Demetria Juarez    Relationship to Patient: Self    Pharmacy: [facility]    Phone Number: [phone] / LVM    Reason for Call: PT CALLING IN WITH POSSIBLE BV (VAGINAL DISCHARGE W/ SLIGHT ODOR) - PT SCHEDULED AN APPT FOR 12/17/24 W/ ESTRELLA GUZMAN BUT WANTS TO KNOW IF SOMETHING CAN BE CALLED INTO HER PHARMACY NOW - HUB WAS UNABLE TO WT TO OFFICE TO SEE IF THERE WAS AVAILABILITY SOONER -    PLEASE CALL THE PT TO LET HER KNOW IF THIS REQUEST CAN BE MADE    THANK YOU!

## 2024-12-13 ENCOUNTER — OFFICE VISIT (OUTPATIENT)
Dept: OBSTETRICS AND GYNECOLOGY | Facility: CLINIC | Age: 44
End: 2024-12-13
Payer: COMMERCIAL

## 2024-12-13 VITALS
HEIGHT: 66 IN | DIASTOLIC BLOOD PRESSURE: 62 MMHG | SYSTOLIC BLOOD PRESSURE: 110 MMHG | WEIGHT: 184.6 LBS | BODY MASS INDEX: 29.67 KG/M2

## 2024-12-13 DIAGNOSIS — N89.8 VAGINAL ODOR: Primary | ICD-10-CM

## 2024-12-13 NOTE — PROGRESS NOTES
"            Chief Complaint   Patient presents with    Follow-up         Subjective   HPI  Demetria Juarez is a 44 y.o. female, , who presents for evaluation of discharge and odor. The discharge is thin white cloudy with \"mildew\" odor.  Her symptoms have been present for 1-2 month(s).  Patient reports she has been stressed at work for the last \"couple\" of months. Additional she has noticed  none .      Prior to the onset of symptoms she was on antibiotics.  She has not recently changed soaps/detergents/toilet tissue.  Prior to this visit, she has used Flagyl , that was prescribed via telehealth appointment in an attempt to improve her symptoms.     Has been having a flare of her chron's and has colonoscopy scheduled next week.      Sexual History    She is currently sexually active. In the past year there has been NO new sexual partners. Condoms are never used.  She would not like to be screened for STD's at today's exam.    Current birth control method: hysterectomy          Additional OB/GYN History   Last Pap :   Last Completed Pap Smear       This patient has no relevant Health Maintenance data.            OB History          4    Para   3    Term   3            AB   1    Living   3         SAB   1    IAB        Ectopic        Molar        Multiple   0    Live Births   3                The additional following portions of the patient's history were reviewed and updated as appropriate: allergies and current medications.    Review of Systems   Constitutional: Negative.    Respiratory: Negative.     Cardiovascular: Negative.    Gastrointestinal: Negative.    Genitourinary:  Positive for vaginal discharge.   Psychiatric/Behavioral: Negative.       All other systems reviewed and are negative.     I have reviewed and agree with the HPI, ROS, and historical information as entered above. JULI Richmond      Objective   /62 (BP Location: Right arm, Patient Position: Sitting, Cuff " "Size: Adult)   Ht 167.6 cm (65.98\")   Wt 83.7 kg (184 lb 9.6 oz)   LMP 03/15/2021   BMI 29.81 kg/m²     Physical Exam  Vitals and nursing note reviewed. Exam conducted with a chaperone present.   Constitutional:       Appearance: Normal appearance. She is normal weight.   Pulmonary:      Effort: Pulmonary effort is normal.   Genitourinary:     General: Normal vulva.      Exam position: Lithotomy position.      Labia:         Right: No rash, tenderness or lesion.         Left: No rash, tenderness or lesion.       Vagina: Vaginal discharge present. No erythema or tenderness.      Uterus: Absent.       Comments: Sticky white discharge that does not appear abnormal. No odor noted.    Cervix and uterus surgically absent  Musculoskeletal:         General: Normal range of motion.   Neurological:      Mental Status: She is alert and oriented to person, place, and time.         Wet mount performed? Yes. No pertinent positives.    Assessment & Plan     Assessment and Plan    Problem List Items Addressed This Visit    None  Visit Diagnoses       Vaginal odor    -  Primary    Relevant Orders    NuSwab VG, Candida 6sp - Swab, Vagina (Completed)    Anaerobic & Aerobic Culture ([facility] Only) - Swab, Vagina (Completed)                 Cultures obtained for yeast, bv, aerobic/anaerobic bacteria  Will treat as indicated.         Chasidy Glover, JULI  12/13/2024   "

## 2024-12-17 LAB
A VAGINAE DNA VAG QL NAA+PROBE: NORMAL SCORE
BVAB2 DNA VAG QL NAA+PROBE: NORMAL SCORE
C ALBICANS DNA VAG QL NAA+PROBE: NEGATIVE
C GLABRATA DNA VAG QL NAA+PROBE: NEGATIVE
C KRUSEI DNA VAG QL NAA+PROBE: NEGATIVE
C LUSITANIAE DNA VAG QL NAA+PROBE: NEGATIVE
CANDIDA DNA VAG QL NAA+PROBE: NEGATIVE
MEGA1 DNA VAG QL NAA+PROBE: NORMAL SCORE
T VAGINALIS DNA VAG QL NAA+PROBE: NEGATIVE

## 2024-12-19 DIAGNOSIS — N89.8 VAGINAL ODOR: Primary | ICD-10-CM

## 2024-12-19 DIAGNOSIS — N89.8 VAGINAL DISCHARGE: ICD-10-CM

## 2024-12-19 LAB
BACTERIA SPEC AEROBE CULT: NORMAL
BACTERIA SPEC ANAEROBE CULT: NORMAL
BACTERIA SPEC CULT: NORMAL
BACTERIA SPEC CULT: NORMAL

## 2024-12-19 RX ORDER — CLINDAMYCIN HYDROCHLORIDE 300 MG/1
300 CAPSULE ORAL 3 TIMES DAILY
Qty: 21 CAPSULE | Refills: 0 | Status: SHIPPED | OUTPATIENT
Start: 2024-12-19 | End: 2024-12-26

## 2024-12-19 RX ORDER — FLUCONAZOLE 150 MG/1
TABLET ORAL
Qty: 2 TABLET | Refills: 1 | Status: SHIPPED | OUTPATIENT
Start: 2024-12-19

## 2025-02-14 ENCOUNTER — LAB (OUTPATIENT)
Dept: LAB | Facility: HOSPITAL | Age: 45
End: 2025-02-14
Payer: COMMERCIAL

## 2025-02-14 DIAGNOSIS — Z13.79 GENETIC TESTING: ICD-10-CM

## 2025-02-14 PROCEDURE — 36415 COLL VENOUS BLD VENIPUNCTURE: CPT

## 2025-02-28 LAB
ALLELIC STATE: NORMAL
AMBRY INTERPRETATION REPORT: NORMAL
AMINO ACID CHANGE: NORMAL
CHROMOSOME BLD/T: 5
DNA CHANGE: NORMAL
GEN ALLELE LOC ID: NORMAL
GENE DIS ANL INTERP-IMP: NORMAL
GENE DIS SEQ VAR INTERP-IMP: NORMAL
GENE STUDIED ID: NORMAL
GENETIC VAR ASSESS: PRESENT
GENOMIC SOURCE CLASS: NORMAL
HUMAN REF SEQ ASSEMBLY+BUILD: NORMAL
SIMPLE VAR ID: NORMAL
SIMPLE VAR ID: NORMAL
TRANSCRIPT REF SEQUENCE ID: NORMAL
VARIANT CATEGORY: NORMAL

## 2025-07-23 ENCOUNTER — OFFICE VISIT (OUTPATIENT)
Dept: FAMILY MEDICINE CLINIC | Facility: CLINIC | Age: 45
End: 2025-07-23
Payer: COMMERCIAL

## 2025-07-23 VITALS
HEART RATE: 79 BPM | WEIGHT: 184.5 LBS | DIASTOLIC BLOOD PRESSURE: 84 MMHG | HEIGHT: 66 IN | BODY MASS INDEX: 29.65 KG/M2 | OXYGEN SATURATION: 99 % | SYSTOLIC BLOOD PRESSURE: 120 MMHG | TEMPERATURE: 98 F

## 2025-07-23 DIAGNOSIS — R51.9 CHRONIC NONINTRACTABLE HEADACHE, UNSPECIFIED HEADACHE TYPE: Primary | ICD-10-CM

## 2025-07-23 DIAGNOSIS — G89.29 CHRONIC NONINTRACTABLE HEADACHE, UNSPECIFIED HEADACHE TYPE: Primary | ICD-10-CM

## 2025-07-23 DIAGNOSIS — G43.109 MIGRAINE WITH AURA AND WITHOUT STATUS MIGRAINOSUS, NOT INTRACTABLE: ICD-10-CM

## 2025-07-23 PROBLEM — R10.31 RIGHT LOWER QUADRANT PAIN: Status: RESOLVED | Noted: 2024-09-10 | Resolved: 2025-07-23

## 2025-07-23 RX ORDER — TOPIRAMATE 25 MG/1
25 TABLET, COATED ORAL DAILY
COMMUNITY
Start: 2025-03-27

## 2025-07-23 RX ORDER — UPADACITINIB 30 MG/1
TABLET, EXTENDED RELEASE ORAL
COMMUNITY
Start: 2025-02-20

## 2025-07-23 NOTE — PROGRESS NOTES
"Chief Complaint  Headache (Pt is here for ongoing headaches onset 1 month. She has been on Topamax for about a week. She has only used Topamax, Advil and Tylenol. )    Subjective          Demetria Juarez presents to [facility]  History of Present Illness  Pt has had ongoing headaches for the past few years. She has been to the ER and had a CT done of her brain and CTA of her brain/neck. She was given Topamax but hasn't noticed much improvement. She has a daily, persistent headache. She has been diagnosed with migraines in the past.   Headache    Associated symptoms: headaches      Associated symptoms: no chest pain, no fatigue, no fever and no shortness of breath        History of Present Illness      Objective   Vital Signs:   /84   Pulse 79   Temp 98 °F (36.7 °C) (Oral)   Ht 167.6 cm (66\")   Wt 83.7 kg (184 lb 8 oz)   SpO2 99%   BMI 29.78 kg/m²     Body mass index is 29.78 kg/m².    Review of Systems   Constitutional:  Negative for fatigue and fever.   Respiratory:  Negative for shortness of breath.    Cardiovascular:  Negative for chest pain, palpitations and leg swelling.   Neurological:  Negative for syncope.   Psychiatric/Behavioral:  The patient is not nervous/anxious.           Current Outpatient Medications:     Topamax 25 MG tablet, Take 1 tablet by mouth Daily., Disp: , Rfl:     Upadacitinib ER (Rinvoq) 30 MG tablet sustained-release 24 hour, Take 1 tablet every day by oral route for 30 days, for Crohns - Post-Induction Maintanence Dosing., Disp: , Rfl:       Allergies: Adhesive tape and Ciprofloxacin    Physical Exam  Constitutional:       Appearance: Normal appearance.   HENT:      Head: Normocephalic.   Eyes:      Conjunctiva/sclera: Conjunctivae normal.      Pupils: Pupils are equal, round, and reactive to light.   Cardiovascular:      Rate and Rhythm: Normal rate and regular rhythm.      Heart sounds: Normal heart sounds.   Pulmonary:      Effort: " Pulmonary effort is normal.      Breath sounds: Normal breath sounds.   Abdominal:      Tenderness: There is no abdominal tenderness.   Musculoskeletal:         General: Normal range of motion.   Skin:     General: Skin is warm and dry.      Capillary Refill: Capillary refill takes less than 2 seconds.   Neurological:      General: No focal deficit present.      Mental Status: She is alert and oriented to person, place, and time.   Psychiatric:         Mood and Affect: Mood normal.         Behavior: Behavior normal.         Thought Content: Thought content normal.         Judgment: Judgment normal.          Physical Exam         Result Review :                Results            Assessment and Plan    Diagnoses and all orders for this visit:    1. Chronic nonintractable headache, unspecified headache type (Primary)  Comments:  MRI ordered and F/U with neurology. Increase fluids and begin Mg at bedtime. RTC or go to the ER for worsened sx.  Orders:  -     MRI Brain Without Contrast; Future  -     Ambulatory Referral to Neurology    2. Migraine with aura and without status migrainosus, not intractable  Comments:  Try samples of Qulipta. Call in 2 weeks with results.                  Follow Up   Return in about 1 month (around 8/23/2025) for if not improving or sooner if symptoms worsen.  Patient was given instructions and counseling regarding her condition or for health maintenance advice. Please see specific information pulled into the AVS if appropriate.     JULI Harden

## 2025-07-31 ENCOUNTER — HOSPITAL ENCOUNTER (OUTPATIENT)
Dept: MRI IMAGING | Facility: HOSPITAL | Age: 45
Discharge: HOME | End: 2025-07-31
Admitting: NURSE PRACTITIONER
Payer: COMMERCIAL

## 2025-07-31 DIAGNOSIS — G89.29 CHRONIC NONINTRACTABLE HEADACHE, UNSPECIFIED HEADACHE TYPE: ICD-10-CM

## 2025-07-31 DIAGNOSIS — R51.9 CHRONIC NONINTRACTABLE HEADACHE, UNSPECIFIED HEADACHE TYPE: ICD-10-CM

## 2025-07-31 PROCEDURE — 70551 MRI BRAIN STEM W/O DYE: CPT

## 2025-08-01 DIAGNOSIS — J32.9 CHRONIC SINUSITIS, UNSPECIFIED LOCATION: Primary | ICD-10-CM

## 2025-08-01 RX ORDER — CEFDINIR 300 MG/1
300 CAPSULE ORAL 2 TIMES DAILY
Qty: 20 CAPSULE | Refills: 0 | Status: SHIPPED | OUTPATIENT
Start: 2025-08-01

## 2025-08-14 RX ORDER — ATOGEPANT 60 MG/1
60 TABLET ORAL DAILY
Qty: 30 TABLET | Refills: 5 | Status: SHIPPED | OUTPATIENT
Start: 2025-08-14

## (undated) DEVICE — FLOSEAL MATRIX IS INDICATED IN SURGICAL PROCEDURES (OTHER THAN IN OPHTHALMIC) AS AN ADJUNCT TO HEMOSTASIS WHEN CONTROL OF BLEEDING BY LIGATURE OR CONVENTIONALPROCEDURES IS INEFFECTIVE OR IMPRACTICAL.: Brand: FLOSEAL HEMOSTATIC MATRIX

## (undated) DEVICE — SOL IRR H2O BTL 1000ML STRL

## (undated) DEVICE — SOL IRR NACL 0.9PCT BT 1000ML

## (undated) DEVICE — PROXIMATE RH ROTATING HEAD SKIN STAPLERS (35 WIDE) CONTAINS 35 STAINLESS STEEL STAPLES: Brand: PROXIMATE

## (undated) DEVICE — PK C/SECT 10

## (undated) DEVICE — SUT PDS 1 TP1 48IN Z880G BX/12

## (undated) DEVICE — GLV SURG BIOGEL LTX PF 6 1/2

## (undated) DEVICE — TRY SPINE BLCK WHITACRE 25G 3X5IN

## (undated) DEVICE — SUT GUT CHRM 2/0 CT1 27IN 811H

## (undated) DEVICE — DRSNG TELFA PAD NONADH STR 1S 3X8IN

## (undated) DEVICE — SUT VIC 4/0 KS 27IN VCP662H

## (undated) DEVICE — SUT GUT CHRM 1 CTX 36IN 905H

## (undated) DEVICE — 39" SINGLE PATIENT USE HOVERMATT: Brand: SINGLE PATIENT USE HOVERMATT

## (undated) DEVICE — SPNG GZ WOVN 4X4IN 12PLY 10/BX STRL